# Patient Record
Sex: MALE | Race: WHITE | NOT HISPANIC OR LATINO | Employment: OTHER | URBAN - METROPOLITAN AREA
[De-identification: names, ages, dates, MRNs, and addresses within clinical notes are randomized per-mention and may not be internally consistent; named-entity substitution may affect disease eponyms.]

---

## 2022-01-27 ENCOUNTER — TRANSCRIBE ORDERS (OUTPATIENT)
Dept: ADMINISTRATIVE | Facility: HOSPITAL | Age: 62
End: 2022-01-27

## 2022-01-27 DIAGNOSIS — R91.8 PULMONARY NODULES: Primary | ICD-10-CM

## 2022-02-18 ENCOUNTER — HOSPITAL ENCOUNTER (OUTPATIENT)
Dept: CT IMAGING | Facility: HOSPITAL | Age: 62
Discharge: HOME | End: 2022-02-18
Admitting: INTERNAL MEDICINE

## 2022-02-18 DIAGNOSIS — R91.8 PULMONARY NODULES: ICD-10-CM

## 2022-02-18 LAB — CREAT BLDA-MCNC: 0.8 MG/DL (ref 0.6–1.3)

## 2022-02-18 PROCEDURE — 25010000002 IOPAMIDOL 61 % SOLUTION: Performed by: INTERNAL MEDICINE

## 2022-02-18 PROCEDURE — 71260 CT THORAX DX C+: CPT

## 2022-02-18 PROCEDURE — 82565 ASSAY OF CREATININE: CPT

## 2022-02-18 RX ADMIN — IOPAMIDOL 75 ML: 612 INJECTION, SOLUTION INTRAVENOUS at 14:33

## 2024-05-27 ENCOUNTER — HOSPITAL ENCOUNTER (OUTPATIENT)
Facility: HOSPITAL | Age: 64
Setting detail: OBSERVATION
Discharge: NURSING, REHAB OR LONG-TERM CARE | End: 2024-05-30
Attending: EMERGENCY MEDICINE | Admitting: INTERNAL MEDICINE
Payer: MEDICAID

## 2024-05-27 ENCOUNTER — APPOINTMENT (OUTPATIENT)
Dept: GENERAL RADIOLOGY | Facility: HOSPITAL | Age: 64
End: 2024-05-27
Payer: MEDICAID

## 2024-05-27 ENCOUNTER — APPOINTMENT (OUTPATIENT)
Dept: CT IMAGING | Facility: HOSPITAL | Age: 64
End: 2024-05-27
Payer: MEDICAID

## 2024-05-27 DIAGNOSIS — S09.90XA CLOSED HEAD INJURY, INITIAL ENCOUNTER: ICD-10-CM

## 2024-05-27 DIAGNOSIS — I95.9 HYPOTENSION, UNSPECIFIED HYPOTENSION TYPE: Primary | ICD-10-CM

## 2024-05-27 DIAGNOSIS — R94.31 ABNORMAL EKG: ICD-10-CM

## 2024-05-27 DIAGNOSIS — E87.20 LACTIC ACIDOSIS: ICD-10-CM

## 2024-05-27 LAB
ALBUMIN SERPL-MCNC: 3.7 G/DL (ref 3.5–5.2)
ALBUMIN/GLOB SERPL: 1.6 G/DL
ALP SERPL-CCNC: 119 U/L (ref 39–117)
ALT SERPL W P-5'-P-CCNC: 22 U/L (ref 1–41)
ANION GAP SERPL CALCULATED.3IONS-SCNC: 9 MMOL/L (ref 5–15)
AST SERPL-CCNC: 15 U/L (ref 1–40)
BASOPHILS # BLD AUTO: 0.04 10*3/MM3 (ref 0–0.2)
BASOPHILS NFR BLD AUTO: 0.4 % (ref 0–1.5)
BILIRUB SERPL-MCNC: 0.3 MG/DL (ref 0–1.2)
BILIRUB UR QL STRIP: NEGATIVE
BUN SERPL-MCNC: 17 MG/DL (ref 8–23)
BUN/CREAT SERPL: 13 (ref 7–25)
CALCIUM SPEC-SCNC: 9 MG/DL (ref 8.6–10.5)
CHLORIDE SERPL-SCNC: 111 MMOL/L (ref 98–107)
CLARITY UR: CLEAR
CO2 SERPL-SCNC: 23 MMOL/L (ref 22–29)
COLOR UR: YELLOW
CREAT SERPL-MCNC: 1.31 MG/DL (ref 0.76–1.27)
D-LACTATE SERPL-SCNC: 1.5 MMOL/L (ref 0.5–2)
D-LACTATE SERPL-SCNC: 2.8 MMOL/L (ref 0.5–2)
DEPRECATED RDW RBC AUTO: 43.9 FL (ref 37–54)
EGFRCR SERPLBLD CKD-EPI 2021: 60.4 ML/MIN/1.73
EOSINOPHIL # BLD AUTO: 0.03 10*3/MM3 (ref 0–0.4)
EOSINOPHIL NFR BLD AUTO: 0.3 % (ref 0.3–6.2)
ERYTHROCYTE [DISTWIDTH] IN BLOOD BY AUTOMATED COUNT: 12.8 % (ref 12.3–15.4)
GEN 5 2HR TROPONIN T REFLEX: 10 NG/L
GLOBULIN UR ELPH-MCNC: 2.3 GM/DL
GLUCOSE SERPL-MCNC: 179 MG/DL (ref 65–99)
GLUCOSE UR STRIP-MCNC: NEGATIVE MG/DL
HCT VFR BLD AUTO: 46.9 % (ref 37.5–51)
HGB BLD-MCNC: 14.6 G/DL (ref 13–17.7)
HGB UR QL STRIP.AUTO: NEGATIVE
IMM GRANULOCYTES # BLD AUTO: 0.02 10*3/MM3 (ref 0–0.05)
IMM GRANULOCYTES NFR BLD AUTO: 0.2 % (ref 0–0.5)
KETONES UR QL STRIP: NEGATIVE
LEUKOCYTE ESTERASE UR QL STRIP.AUTO: NEGATIVE
LYMPHOCYTES # BLD AUTO: 1.18 10*3/MM3 (ref 0.7–3.1)
LYMPHOCYTES NFR BLD AUTO: 11.8 % (ref 19.6–45.3)
MCH RBC QN AUTO: 29 PG (ref 26.6–33)
MCHC RBC AUTO-ENTMCNC: 31.1 G/DL (ref 31.5–35.7)
MCV RBC AUTO: 93.1 FL (ref 79–97)
MONOCYTES # BLD AUTO: 0.29 10*3/MM3 (ref 0.1–0.9)
MONOCYTES NFR BLD AUTO: 2.9 % (ref 5–12)
NEUTROPHILS NFR BLD AUTO: 8.41 10*3/MM3 (ref 1.7–7)
NEUTROPHILS NFR BLD AUTO: 84.4 % (ref 42.7–76)
NITRITE UR QL STRIP: NEGATIVE
NRBC BLD AUTO-RTO: 0 /100 WBC (ref 0–0.2)
PH UR STRIP.AUTO: 6 [PH] (ref 5–8)
PLATELET # BLD AUTO: 273 10*3/MM3 (ref 140–450)
PMV BLD AUTO: 9.9 FL (ref 6–12)
POTASSIUM SERPL-SCNC: 4.4 MMOL/L (ref 3.5–5.2)
PROT SERPL-MCNC: 6 G/DL (ref 6–8.5)
PROT UR QL STRIP: ABNORMAL
RBC # BLD AUTO: 5.04 10*6/MM3 (ref 4.14–5.8)
SODIUM SERPL-SCNC: 143 MMOL/L (ref 136–145)
SP GR UR STRIP: 1.01 (ref 1–1.03)
TROPONIN T DELTA: 0 NG/L
TROPONIN T SERPL HS-MCNC: 10 NG/L
UROBILINOGEN UR QL STRIP: ABNORMAL
WBC NRBC COR # BLD AUTO: 9.97 10*3/MM3 (ref 3.4–10.8)

## 2024-05-27 PROCEDURE — G0378 HOSPITAL OBSERVATION PER HR: HCPCS

## 2024-05-27 PROCEDURE — 84484 ASSAY OF TROPONIN QUANT: CPT | Performed by: PHYSICIAN ASSISTANT

## 2024-05-27 PROCEDURE — 93010 ELECTROCARDIOGRAM REPORT: CPT | Performed by: INTERNAL MEDICINE

## 2024-05-27 PROCEDURE — 96361 HYDRATE IV INFUSION ADD-ON: CPT

## 2024-05-27 PROCEDURE — 80053 COMPREHEN METABOLIC PANEL: CPT | Performed by: PHYSICIAN ASSISTANT

## 2024-05-27 PROCEDURE — 81003 URINALYSIS AUTO W/O SCOPE: CPT | Performed by: PHYSICIAN ASSISTANT

## 2024-05-27 PROCEDURE — 96372 THER/PROPH/DIAG INJ SC/IM: CPT

## 2024-05-27 PROCEDURE — 25010000002 ENOXAPARIN PER 10 MG: Performed by: INTERNAL MEDICINE

## 2024-05-27 PROCEDURE — 93005 ELECTROCARDIOGRAM TRACING: CPT | Performed by: EMERGENCY MEDICINE

## 2024-05-27 PROCEDURE — 83605 ASSAY OF LACTIC ACID: CPT | Performed by: PHYSICIAN ASSISTANT

## 2024-05-27 PROCEDURE — 85025 COMPLETE CBC W/AUTO DIFF WBC: CPT | Performed by: PHYSICIAN ASSISTANT

## 2024-05-27 PROCEDURE — 99285 EMERGENCY DEPT VISIT HI MDM: CPT

## 2024-05-27 PROCEDURE — 72125 CT NECK SPINE W/O DYE: CPT

## 2024-05-27 PROCEDURE — 25810000003 SODIUM CHLORIDE 0.9 % SOLUTION: Performed by: INTERNAL MEDICINE

## 2024-05-27 PROCEDURE — 36415 COLL VENOUS BLD VENIPUNCTURE: CPT | Performed by: PHYSICIAN ASSISTANT

## 2024-05-27 PROCEDURE — 25810000003 SODIUM CHLORIDE 0.9 % SOLUTION: Performed by: PHYSICIAN ASSISTANT

## 2024-05-27 PROCEDURE — 70450 CT HEAD/BRAIN W/O DYE: CPT

## 2024-05-27 PROCEDURE — 71045 X-RAY EXAM CHEST 1 VIEW: CPT

## 2024-05-27 RX ORDER — ACETAMINOPHEN 325 MG/1
650 TABLET ORAL EVERY 4 HOURS PRN
COMMUNITY

## 2024-05-27 RX ORDER — HYDRALAZINE HYDROCHLORIDE 50 MG/1
100 TABLET, FILM COATED ORAL 3 TIMES DAILY
Status: DISCONTINUED | OUTPATIENT
Start: 2024-05-27 | End: 2024-05-30 | Stop reason: HOSPADM

## 2024-05-27 RX ORDER — CARVEDILOL 25 MG/1
1 TABLET ORAL 2 TIMES DAILY
COMMUNITY

## 2024-05-27 RX ORDER — HYDRALAZINE HYDROCHLORIDE 100 MG/1
1 TABLET, FILM COATED ORAL 3 TIMES DAILY
COMMUNITY

## 2024-05-27 RX ORDER — CLONIDINE HYDROCHLORIDE 0.3 MG/1
0.3 TABLET ORAL 2 TIMES DAILY PRN
COMMUNITY
End: 2024-05-30 | Stop reason: HOSPADM

## 2024-05-27 RX ORDER — CLOTRIMAZOLE 1 %
1 CREAM (GRAM) TOPICAL 2 TIMES DAILY
COMMUNITY
Start: 2024-04-11 | End: 2024-05-30 | Stop reason: HOSPADM

## 2024-05-27 RX ORDER — SODIUM CHLORIDE 9 MG/ML
125 INJECTION, SOLUTION INTRAVENOUS CONTINUOUS
Status: DISCONTINUED | OUTPATIENT
Start: 2024-05-27 | End: 2024-05-29

## 2024-05-27 RX ORDER — FOLIC ACID 1 MG/1
1000 TABLET ORAL DAILY
Status: DISCONTINUED | OUTPATIENT
Start: 2024-05-27 | End: 2024-05-30 | Stop reason: HOSPADM

## 2024-05-27 RX ORDER — BUSPIRONE HYDROCHLORIDE 5 MG/1
5 TABLET ORAL 3 TIMES DAILY
Status: DISCONTINUED | OUTPATIENT
Start: 2024-05-27 | End: 2024-05-30 | Stop reason: HOSPADM

## 2024-05-27 RX ORDER — ENOXAPARIN SODIUM 100 MG/ML
40 INJECTION SUBCUTANEOUS EVERY 24 HOURS
Status: DISCONTINUED | OUTPATIENT
Start: 2024-05-27 | End: 2024-05-28

## 2024-05-27 RX ORDER — AMLODIPINE BESYLATE 10 MG/1
1 TABLET ORAL DAILY
COMMUNITY

## 2024-05-27 RX ORDER — BUSPIRONE HYDROCHLORIDE 5 MG/1
5 TABLET ORAL 2 TIMES DAILY
COMMUNITY

## 2024-05-27 RX ORDER — CARVEDILOL 25 MG/1
25 TABLET ORAL 2 TIMES DAILY
Status: DISCONTINUED | OUTPATIENT
Start: 2024-05-27 | End: 2024-05-30 | Stop reason: HOSPADM

## 2024-05-27 RX ORDER — BISACODYL 10 MG
10 SUPPOSITORY, RECTAL RECTAL DAILY PRN
COMMUNITY
End: 2024-05-30 | Stop reason: HOSPADM

## 2024-05-27 RX ORDER — FOLIC ACID 1 MG/1
1 TABLET ORAL 2 TIMES DAILY
COMMUNITY

## 2024-05-27 RX ORDER — ACETAMINOPHEN 325 MG/1
650 TABLET ORAL EVERY 4 HOURS PRN
Status: DISCONTINUED | OUTPATIENT
Start: 2024-05-27 | End: 2024-05-30 | Stop reason: HOSPADM

## 2024-05-27 RX ADMIN — BUSPIRONE HYDROCHLORIDE 5 MG: 5 TABLET ORAL at 18:09

## 2024-05-27 RX ADMIN — FOLIC ACID 1000 MCG: 1 TABLET ORAL at 18:09

## 2024-05-27 RX ADMIN — SODIUM CHLORIDE 125 ML/HR: 9 INJECTION, SOLUTION INTRAVENOUS at 16:09

## 2024-05-27 RX ADMIN — ENOXAPARIN SODIUM 40 MG: 100 INJECTION SUBCUTANEOUS at 18:08

## 2024-05-27 RX ADMIN — BUSPIRONE HYDROCHLORIDE 5 MG: 5 TABLET ORAL at 22:42

## 2024-05-27 RX ADMIN — HYDRALAZINE HYDROCHLORIDE 100 MG: 50 TABLET ORAL at 22:42

## 2024-05-27 RX ADMIN — SODIUM CHLORIDE 1000 ML: 9 INJECTION, SOLUTION INTRAVENOUS at 12:48

## 2024-05-27 RX ADMIN — HYDRALAZINE HYDROCHLORIDE 100 MG: 50 TABLET ORAL at 18:09

## 2024-05-27 RX ADMIN — SODIUM CHLORIDE 125 ML/HR: 9 INJECTION, SOLUTION INTRAVENOUS at 23:54

## 2024-05-27 RX ADMIN — CARVEDILOL 25 MG: 25 TABLET, FILM COATED ORAL at 21:44

## 2024-05-27 NOTE — PLAN OF CARE
Goal Outcome Evaluation:  Plan of Care Reviewed With: patient        Progress: no change  Outcome Evaluation: A&Ox4. RA. BP improving. No skin issues noted. Reg cardiac diet. IVF. No other issues noted. VSS.

## 2024-05-27 NOTE — ED NOTES
Patient to ER via ems from [facility]  Patient was given BP med got up later to go to the bathroom and feel  EMS reports patient is hypotensive   Patient hit face pain hit is nose    Facility reports patient is more pale than normal       Patient has hx of high BP

## 2024-05-27 NOTE — ED PROVIDER NOTES
EMERGENCY DEPARTMENT ENCOUNTER    Room Number:  40/40  Date of encounter:  5/27/2024  PCP: José Aviles MD  Historian: Patient  Chronic or social conditions impacting care (social determinants of health): Nursing home resident    HPI:  Chief Complaint: Weakness, fall  A complete HPI/ROS/PMH/PSH/SH/FH are unobtainable due to: Nothing    Context: Amos Nevarez is a 63 y.o. male with a history of hypertension.  He presents to the ED c/o acute injuries sustained in a fall prior to arrival.  Patient reportedly was walking to the bathroom when he suddenly became lightheaded and fell.  He did not lose consciousness.  He fell and hitting his head on the ground.  He takes no blood thinners.  Patient was found to be hypotensive by nursing home staff and EMS.  He states he had been in his normal state of health earlier this morning.  He denies any recent illness.  Denies any recent chest pain, shortness of breath.    Review of prior external notes (non-ED):   Reviewed primary care office visit from 11/3/2021.  Patient being followed for malnutrition, hypertension.    Review of prior external test results outside of this encounter:  None    PAST MEDICAL HISTORY  Active Ambulatory Problems     Diagnosis Date Noted    No Active Ambulatory Problems     Resolved Ambulatory Problems     Diagnosis Date Noted    No Resolved Ambulatory Problems     No Additional Past Medical History         PAST SURGICAL HISTORY  No past surgical history on file.      FAMILY HISTORY  No family history on file.      SOCIAL HISTORY  Social History     Socioeconomic History    Marital status:          ALLERGIES  Patient has no known allergies.        REVIEW OF SYSTEMS  All systems reviewed and negative except for those discussed in HPI.       PHYSICAL EXAM    I have reviewed the triage vital signs and nursing notes.    ED Triage Vitals [05/27/24 1218]   Temp Heart Rate Resp BP SpO2   97.8 °F (36.6 °C) 56 16 (!) 81/49 96 %      Temp src  Heart Rate Source Patient Position BP Location FiO2 (%)   -- -- -- -- --       Physical Exam  GENERAL: Alert, oriented, chronically ill-appearing, not distressed  HENT: Mild tenderness over the nose without bleeding or septal hematoma.  No dental injury.  No cervical tenderness.  EYES: no scleral icterus, EOMI  CV: regular rhythm, regular rate, no murmur  RESPIRATORY: normal effort, CTA  ABDOMEN: soft, nontender  MUSCULOSKELETAL: no deformity, FROM, no calf swelling or tenderness  NEURO: alert, moves all extremities, follows commands  SKIN: warm, dry        LAB RESULTS  Recent Results (from the past 24 hour(s))   ECG 12 Lead Syncope    Collection Time: 05/27/24 12:28 PM   Result Value Ref Range    QT Interval 472 ms    QTC Interval 456 ms   Comprehensive Metabolic Panel    Collection Time: 05/27/24 12:53 PM    Specimen: Blood   Result Value Ref Range    Glucose 179 (H) 65 - 99 mg/dL    BUN 17 8 - 23 mg/dL    Creatinine 1.31 (H) 0.76 - 1.27 mg/dL    Sodium 143 136 - 145 mmol/L    Potassium 4.4 3.5 - 5.2 mmol/L    Chloride 111 (H) 98 - 107 mmol/L    CO2 23.0 22.0 - 29.0 mmol/L    Calcium 9.0 8.6 - 10.5 mg/dL    Total Protein 6.0 6.0 - 8.5 g/dL    Albumin 3.7 3.5 - 5.2 g/dL    ALT (SGPT) 22 1 - 41 U/L    AST (SGOT) 15 1 - 40 U/L    Alkaline Phosphatase 119 (H) 39 - 117 U/L    Total Bilirubin 0.3 0.0 - 1.2 mg/dL    Globulin 2.3 gm/dL    A/G Ratio 1.6 g/dL    BUN/Creatinine Ratio 13.0 7.0 - 25.0    Anion Gap 9.0 5.0 - 15.0 mmol/L    eGFR 60.4 >60.0 mL/min/1.73   High Sensitivity Troponin T    Collection Time: 05/27/24 12:53 PM    Specimen: Blood   Result Value Ref Range    HS Troponin T 10 <22 ng/L   Lactic Acid, Plasma    Collection Time: 05/27/24 12:53 PM    Specimen: Blood   Result Value Ref Range    Lactate 2.8 (C) 0.5 - 2.0 mmol/L   CBC Auto Differential    Collection Time: 05/27/24 12:53 PM    Specimen: Blood   Result Value Ref Range    WBC 9.97 3.40 - 10.80 10*3/mm3    RBC 5.04 4.14 - 5.80 10*6/mm3    Hemoglobin  14.6 13.0 - 17.7 g/dL    Hematocrit 46.9 37.5 - 51.0 %    MCV 93.1 79.0 - 97.0 fL    MCH 29.0 26.6 - 33.0 pg    MCHC 31.1 (L) 31.5 - 35.7 g/dL    RDW 12.8 12.3 - 15.4 %    RDW-SD 43.9 37.0 - 54.0 fl    MPV 9.9 6.0 - 12.0 fL    Platelets 273 140 - 450 10*3/mm3    Neutrophil % 84.4 (H) 42.7 - 76.0 %    Lymphocyte % 11.8 (L) 19.6 - 45.3 %    Monocyte % 2.9 (L) 5.0 - 12.0 %    Eosinophil % 0.3 0.3 - 6.2 %    Basophil % 0.4 0.0 - 1.5 %    Immature Grans % 0.2 0.0 - 0.5 %    Neutrophils, Absolute 8.41 (H) 1.70 - 7.00 10*3/mm3    Lymphocytes, Absolute 1.18 0.70 - 3.10 10*3/mm3    Monocytes, Absolute 0.29 0.10 - 0.90 10*3/mm3    Eosinophils, Absolute 0.03 0.00 - 0.40 10*3/mm3    Basophils, Absolute 0.04 0.00 - 0.20 10*3/mm3    Immature Grans, Absolute 0.02 0.00 - 0.05 10*3/mm3    nRBC 0.0 0.0 - 0.2 /100 WBC   Urinalysis With Microscopic If Indicated (No Culture) - Urine, Clean Catch    Collection Time: 05/27/24  1:32 PM    Specimen: Urine, Clean Catch   Result Value Ref Range    Color, UA Yellow Yellow, Straw    Appearance, UA Clear Clear    pH, UA 6.0 5.0 - 8.0    Specific Gravity, UA 1.008 1.005 - 1.030    Glucose, UA Negative Negative    Ketones, UA Negative Negative    Bilirubin, UA Negative Negative    Blood, UA Negative Negative    Protein, UA Trace (A) Negative    Leuk Esterase, UA Negative Negative    Nitrite, UA Negative Negative    Urobilinogen, UA 1.0 E.U./dL 0.2 - 1.0 E.U./dL       Ordered the above labs and independently reviewed the results.        RADIOLOGY  XR Chest 1 View    Result Date: 5/27/2024  XR CHEST 1 VW-  INDICATION: Weakness  COMPARISON: CT chest 2/18/2022      Hyperinflated lungs corresponding with known emphysematous changes. No focal consolidation. No pleural effusion or pneumothorax. Normal size cardiomediastinal silhouette. No focal osseous abnormality.  This report was finalized on 5/27/2024 2:06 PM by Dr. Juan Hirsch M.D on Workstation: [id number]       I ordered the above noted  radiological studies. Reviewed by me and discussed with radiologist.  See dictation for official radiology interpretation.      MEDICATIONS GIVEN IN ER    Medications   sodium chloride 0.9 % infusion (has no administration in time range)   sodium chloride 0.9 % bolus 1,000 mL (0 mL Intravenous Stopped 5/27/24 1412)         ADDITIONAL ORDERS CONSIDERED BUT NOT ORDERED:  Considered antibiotics however no definitive source found.  I suspect his lactic acidosis may be from his hypotension.      PROGRESS, DATA ANALYSIS, CONSULTS, AND MEDICAL DECISION MAKING    All labs have been independently interpreted by myself.  All radiology studies have been independently interpreted by myself and discussed with radiologist dictating the report.   EKG's independently interpreted by myself.  Discussion below represents my analysis of pertinent findings related to patient's condition, differential diagnosis, treatment plan and final disposition.    I have discussed case with Dr. Florez, emergency room physician.  He has performed his own bedside examination and agrees with treatment plan.    ED Course as of 05/27/24 1458   Mon May 27, 2024   1236 Presents after episode of weakness, hypotension, fall at the nursing home.  Patient is alert and oriented at this time.  Patient is hypotensive.  Patient had head trauma.  No blood thinners.  Plan for cardiac evaluation, CT imaging of the head and cervical spine. [EE]   1238 EKG independently interpreted myself.  Time 1228.  Sinus bradycardia, rate 56.  Normal P/ALLEN.  QRS normal with normal axis.  Inverted T waves laterally.  No previous for comparison. [EE]   1244 BP(!): 81/49 [EE]   1303 Chest x-ray independently interpreted myself shows no acute infiltrate. [EE]   1308 WBC: 9.97 [EE]   1308 Hemoglobin: 14.6 [EE]   1328 HS Troponin T: 10 [EE]   1345 Lactate(!!): 2.8 [EE]   1431 I discussed case with Dr. Aviles, patient's primary care physician.  He agrees to admit. [EE]   1433 BP:  118/76  Patient's blood pressure improved.  He denies any complaints. [EE]      ED Course User Index  [EE] Yves Dasilva PA       AS OF 14:58 EDT VITALS:    BP - 118/76  HR - 75  TEMP - 97.8 °F (36.6 °C)  O2 SATS - 96%        DIAGNOSIS  Final diagnoses:   Hypotension, unspecified hypotension type   Lactic acidosis   Closed head injury, initial encounter   Abnormal EKG         DISPOSITION  Admitted      Dictated utilizing Dragon dictation          Yves Dasilva PA  05/27/24 5602

## 2024-05-27 NOTE — ED NOTES
Nursing report ED to floor  Amos Nevarez  63 y.o.  male    HPI :  HPI (Adult)  Stated Reason for Visit: fall hypotension    Chief Complaint  Chief Complaint   Patient presents with    Fall    Hypotension       Admitting doctor:   José Aviles MD    Admitting diagnosis:   The primary encounter diagnosis was Hypotension, unspecified hypotension type. Diagnoses of Lactic acidosis and Closed head injury, initial encounter were also pertinent to this visit.    Code status:   Current Code Status       Date Active Code Status Order ID Comments User Context       Not on file            Allergies:   Patient has no known allergies.    Isolation:   No active isolations    Intake and Output    Intake/Output Summary (Last 24 hours) at 5/27/2024 1452  Last data filed at 5/27/2024 1412  Gross per 24 hour   Intake 1000 ml   Output --   Net 1000 ml       Weight:   There were no vitals filed for this visit.    Most recent vitals:   Vitals:    05/27/24 1401 05/27/24 1415 05/27/24 1417 05/27/24 1418   BP: 121/69 114/59 104/73 118/76   Patient Position:  Lying Standing Standing   Pulse: 60 65 73 75   Resp:       Temp:       SpO2:           Active LDAs/IV Access:   Lines, Drains & Airways       Active LDAs       Name Placement date Placement time Site Days    Peripheral IV 05/27/24 1246 Anterior;Right Forearm 05/27/24  1246  Forearm  less than 1                    Labs (abnormal labs have a star):   Labs Reviewed   COMPREHENSIVE METABOLIC PANEL - Abnormal; Notable for the following components:       Result Value    Glucose 179 (*)     Creatinine 1.31 (*)     Chloride 111 (*)     Alkaline Phosphatase 119 (*)     All other components within normal limits    Narrative:     GFR Normal >60  Chronic Kidney Disease <60  Kidney Failure <15     LACTIC ACID, PLASMA - Abnormal; Notable for the following components:    Lactate 2.8 (*)     All other components within normal limits   URINALYSIS W/ MICROSCOPIC IF INDICATED (NO CULTURE) -  Abnormal; Notable for the following components:    Protein, UA Trace (*)     All other components within normal limits    Narrative:     Urine microscopic not indicated.   CBC WITH AUTO DIFFERENTIAL - Abnormal; Notable for the following components:    MCHC 31.1 (*)     Neutrophil % 84.4 (*)     Lymphocyte % 11.8 (*)     Monocyte % 2.9 (*)     Neutrophils, Absolute 8.41 (*)     All other components within normal limits   TROPONIN - Normal    Narrative:     High Sensitive Troponin T Reference Range:  <14.0 ng/L- Negative Female for AMI  <22.0 ng/L- Negative Male for AMI  >=14 - Abnormal Female indicating possible myocardial injury.  >=22 - Abnormal Male indicating possible myocardial injury.   Clinicians would have to utilize clinical acumen, EKG, Troponin, and serial changes to determine if it is an Acute Myocardial Infarction or myocardial injury due to an underlying chronic condition.        HIGH SENSITIVITIY TROPONIN T 2HR   LACTIC ACID, REFLEX   CBC AND DIFFERENTIAL    Narrative:     The following orders were created for panel order CBC & Differential.  Procedure                               Abnormality         Status                     ---------                               -----------         ------                     CBC Auto Differential[[id number]]        Abnormal            Final result                 Please view results for these tests on the individual orders.       EKG:   ECG 12 Lead Syncope   Preliminary Result   HEART RATE= 56  bpm   RR Interval= 1071  ms   IN Interval= 122  ms   P Horizontal Axis= 28  deg   P Front Axis= 67  deg   QRSD Interval= 103  ms   QT Interval= 472  ms   QTcB= 456  ms   QRS Axis= 30  deg   T Wave Axis= 240  deg   - ABNORMAL ECG -   Sinus rhythm   Abnormal T, consider ischemia, diffuse leads   Electronically Signed By:    Date and Time of Study: 2024-05-27 12:28:25          Meds given in ED:   Medications   sodium chloride 0.9 % bolus 1,000 mL (0 mL Intravenous Stopped  5/27/24 1412)       Imaging results:  XR Chest 1 View    Result Date: 5/27/2024  Hyperinflated lungs corresponding with known emphysematous changes. No focal consolidation. No pleural effusion or pneumothorax. Normal size cardiomediastinal silhouette. No focal osseous abnormality.  This report was finalized on 5/27/2024 2:06 PM by Dr. Juan Hirsch M.D on Workstation: [id number]       Ambulatory status:   - assist x 1    Social issues:   Social History     Socioeconomic History    Marital status:        Peripheral Neurovascular  Peripheral Neurovascular (Adult)  Peripheral Neurovascular WDL: WDL    Neuro Cognitive  Neuro Cognitive (Adult)  Cognitive/Neuro/Behavioral WDL: WDL, level of consciousness, orientation, speech  Level of Consciousness: Alert  Orientation: oriented x 4  Speech: clear, spontaneous, logical  Pupils  Pupil PERRLA: yes    Learning       Respiratory  Respiratory WDL  Respiratory WDL: WDL, rhythm/pattern  Rhythm/Pattern, Respiratory: no shortness of breath reported, depth regular, pattern regular, unlabored    Abdominal Pain       Pain Assessments  Pain (Adult)  (0-10) Pain Rating: Rest: 0    NIH Stroke Scale       Beatrice Gonzalez RN  05/27/24 14:52 EDT

## 2024-05-28 LAB
ANION GAP SERPL CALCULATED.3IONS-SCNC: 13 MMOL/L (ref 5–15)
BASOPHILS # BLD AUTO: 0.04 10*3/MM3 (ref 0–0.2)
BASOPHILS NFR BLD AUTO: 0.6 % (ref 0–1.5)
BUN SERPL-MCNC: 13 MG/DL (ref 8–23)
BUN/CREAT SERPL: 17.1 (ref 7–25)
CALCIUM SPEC-SCNC: 8.4 MG/DL (ref 8.6–10.5)
CHLORIDE SERPL-SCNC: 111 MMOL/L (ref 98–107)
CO2 SERPL-SCNC: 21 MMOL/L (ref 22–29)
CREAT SERPL-MCNC: 0.76 MG/DL (ref 0.76–1.27)
DEPRECATED RDW RBC AUTO: 42.8 FL (ref 37–54)
EGFRCR SERPLBLD CKD-EPI 2021: 99.7 ML/MIN/1.73
EOSINOPHIL # BLD AUTO: 0.06 10*3/MM3 (ref 0–0.4)
EOSINOPHIL NFR BLD AUTO: 0.8 % (ref 0.3–6.2)
ERYTHROCYTE [DISTWIDTH] IN BLOOD BY AUTOMATED COUNT: 12.9 % (ref 12.3–15.4)
GLUCOSE SERPL-MCNC: 88 MG/DL (ref 65–99)
HCT VFR BLD AUTO: 41.8 % (ref 37.5–51)
HGB BLD-MCNC: 13.7 G/DL (ref 13–17.7)
IMM GRANULOCYTES # BLD AUTO: 0.04 10*3/MM3 (ref 0–0.05)
IMM GRANULOCYTES NFR BLD AUTO: 0.6 % (ref 0–0.5)
LYMPHOCYTES # BLD AUTO: 1.43 10*3/MM3 (ref 0.7–3.1)
LYMPHOCYTES NFR BLD AUTO: 19.7 % (ref 19.6–45.3)
MCH RBC QN AUTO: 29.5 PG (ref 26.6–33)
MCHC RBC AUTO-ENTMCNC: 32.8 G/DL (ref 31.5–35.7)
MCV RBC AUTO: 90.1 FL (ref 79–97)
MONOCYTES # BLD AUTO: 0.46 10*3/MM3 (ref 0.1–0.9)
MONOCYTES NFR BLD AUTO: 6.3 % (ref 5–12)
NEUTROPHILS NFR BLD AUTO: 5.22 10*3/MM3 (ref 1.7–7)
NEUTROPHILS NFR BLD AUTO: 72 % (ref 42.7–76)
NRBC BLD AUTO-RTO: 0 /100 WBC (ref 0–0.2)
PLATELET # BLD AUTO: 268 10*3/MM3 (ref 140–450)
PMV BLD AUTO: 10.2 FL (ref 6–12)
POTASSIUM SERPL-SCNC: 3.4 MMOL/L (ref 3.5–5.2)
QT INTERVAL: 472 MS
QTC INTERVAL: 456 MS
RBC # BLD AUTO: 4.64 10*6/MM3 (ref 4.14–5.8)
SODIUM SERPL-SCNC: 145 MMOL/L (ref 136–145)
WBC NRBC COR # BLD AUTO: 7.25 10*3/MM3 (ref 3.4–10.8)

## 2024-05-28 PROCEDURE — 87102 FUNGUS ISOLATION CULTURE: CPT | Performed by: INTERNAL MEDICINE

## 2024-05-28 PROCEDURE — G0378 HOSPITAL OBSERVATION PER HR: HCPCS

## 2024-05-28 PROCEDURE — 96361 HYDRATE IV INFUSION ADD-ON: CPT

## 2024-05-28 PROCEDURE — 85025 COMPLETE CBC W/AUTO DIFF WBC: CPT | Performed by: INTERNAL MEDICINE

## 2024-05-28 PROCEDURE — 25010000002 ENOXAPARIN PER 10 MG: Performed by: INTERNAL MEDICINE

## 2024-05-28 PROCEDURE — 25810000003 SODIUM CHLORIDE 0.9 % SOLUTION: Performed by: INTERNAL MEDICINE

## 2024-05-28 PROCEDURE — 96372 THER/PROPH/DIAG INJ SC/IM: CPT

## 2024-05-28 PROCEDURE — 80048 BASIC METABOLIC PNL TOTAL CA: CPT | Performed by: INTERNAL MEDICINE

## 2024-05-28 RX ORDER — FAMOTIDINE 20 MG/1
20 TABLET, FILM COATED ORAL
Status: DISCONTINUED | OUTPATIENT
Start: 2024-05-29 | End: 2024-05-30 | Stop reason: HOSPADM

## 2024-05-28 RX ORDER — ENOXAPARIN SODIUM 100 MG/ML
30 INJECTION SUBCUTANEOUS EVERY 24 HOURS
Status: DISCONTINUED | OUTPATIENT
Start: 2024-05-28 | End: 2024-05-30 | Stop reason: HOSPADM

## 2024-05-28 RX ORDER — POTASSIUM CHLORIDE 750 MG/1
40 TABLET, FILM COATED, EXTENDED RELEASE ORAL ONCE
Status: COMPLETED | OUTPATIENT
Start: 2024-05-28 | End: 2024-05-28

## 2024-05-28 RX ADMIN — SODIUM CHLORIDE 125 ML/HR: 9 INJECTION, SOLUTION INTRAVENOUS at 09:41

## 2024-05-28 RX ADMIN — CARVEDILOL 25 MG: 25 TABLET, FILM COATED ORAL at 20:00

## 2024-05-28 RX ADMIN — HYDRALAZINE HYDROCHLORIDE 100 MG: 50 TABLET ORAL at 09:40

## 2024-05-28 RX ADMIN — FOLIC ACID 1000 MCG: 1 TABLET ORAL at 09:40

## 2024-05-28 RX ADMIN — SODIUM CHLORIDE 125 ML/HR: 9 INJECTION, SOLUTION INTRAVENOUS at 23:53

## 2024-05-28 RX ADMIN — BUSPIRONE HYDROCHLORIDE 5 MG: 5 TABLET ORAL at 16:39

## 2024-05-28 RX ADMIN — BUSPIRONE HYDROCHLORIDE 5 MG: 5 TABLET ORAL at 20:00

## 2024-05-28 RX ADMIN — POTASSIUM CHLORIDE 40 MEQ: 750 TABLET, EXTENDED RELEASE ORAL at 18:41

## 2024-05-28 RX ADMIN — HYDRALAZINE HYDROCHLORIDE 100 MG: 50 TABLET ORAL at 20:00

## 2024-05-28 RX ADMIN — BUSPIRONE HYDROCHLORIDE 5 MG: 5 TABLET ORAL at 09:40

## 2024-05-28 RX ADMIN — CARVEDILOL 25 MG: 25 TABLET, FILM COATED ORAL at 09:40

## 2024-05-28 RX ADMIN — HYDRALAZINE HYDROCHLORIDE 100 MG: 50 TABLET ORAL at 16:39

## 2024-05-28 RX ADMIN — ENOXAPARIN SODIUM 30 MG: 100 INJECTION SUBCUTANEOUS at 16:39

## 2024-05-28 NOTE — H&P
HISTORY AND PHYSICAL   [facility], [facility]      2024    Patient Identification:    Name: Amos Nevarez  Age: 63 y.o.  Sex: male  :  1960  MRN: [id number]                       Primary Care Physician: José Aviles MD    Chief Complaint:      Chief Complaint   Patient presents with    Fall    Hypotension         History of Present Illness:     Patient is a 63-year-old gentleman, resident of nursing facility.  Patient has history of emphysema, hypertension, anxiety, hyperlipidemia.  Admission, patient was noted to be to go to the bathroom and got dizzy, lost his balance and fell forward onto the toilet.  His blood pressure was 90/67 and after a few minutes it went down to 60/34.  Patient was sent to the ER for evaluation.  In the ER, patient was found to have: Blood pressure 81/49, creatinine 1.31, sodium 143, lactic acid 2.8, WBC 9.87, hemoglobin 14.6.  Urinalysis was negative for infection, chest x-ray show hyperinflated lungs/emphysematous changes.  Nothing acute.  In the ER, patient was given bolus normal saline 1 L, patient was placed in the hospital for further management.      Past Medical History:  History reviewed. No pertinent past medical history.  Past Surgical History:  History reviewed. No pertinent surgical history.   Home Meds:  Medications Prior to Admission   Medication Sig Dispense Refill Last Dose    acetaminophen (TYLENOL) 325 MG tablet Take 2 tablets by mouth Every 4 (Four) Hours As Needed.   Patient Taking Differently    amLODIPine (NORVASC) 10 MG tablet Take 1 tablet by mouth Daily.   2024    busPIRone (BUSPAR) 5 MG tablet Take 1 tablet by mouth 2 (Two) Times a Day.   2024    carvedilol (COREG) 25 MG tablet Take 1 tablet by mouth 2 (Two) Times a Day.   2024    clotrimazole (LOTRIMIN) 1 % cream Apply 1 Application topically to the appropriate area as directed 2 (Two) Times a Day.   2024    folic acid (FOLVITE) 1 MG tablet Take 1 tablet by  "mouth 2 (Two) Times a Day.   Patient Taking Differently    hydrALAZINE (APRESOLINE) 100 MG tablet Take 1 tablet by mouth 3 (Three) Times a Day.   2024    bisacodyl (Dulcolax) 10 MG suppository Insert 1 suppository into the rectum Daily As Needed for Constipation.       cloNIDine (CATAPRES) 0.3 MG tablet Take 1 tablet by mouth 2 (Two) Times a Day As Needed for High Blood Pressure.          Allergies:  No Known Allergies  Immunizations:  Immunization History   Administered Date(s) Administered    COVID-19 (PFIZER) Purple Cap Monovalent 2020, 2021, 10/19/2021    Covid-19 (Pfizer) Gray Cap Monovalent 2022     Social History:   Social History     Social History Narrative    Not on file     Social History     Tobacco Use    Smoking status: Former     Current packs/day: 0.00     Average packs/day: 1 pack/day for 42.0 years (42.0 ttl pk-yrs)     Types: Cigarettes     Start date:      Quit date:      Years since quittin.4    Smokeless tobacco: Never   Substance Use Topics    Alcohol use: Never     Family History:  History reviewed. No pertinent family history.     Review of Systems  See history of present illness and past medical history.    No chest pain, shortness of breath.  No nausea, vomiting diarrhea, constipation  No history of hypertension, orthostatic hypotension  Does have history of difficult to treat hypertension, on multiple medication.      Objective:    Exam:    T MAX 24 hrs: Temp (24hrs), Av.8 °F (36.6 °C), Min:97.5 °F (36.4 °C), Max:98.1 °F (36.7 °C)    Vitals Ranges:   Temp:  [97.5 °F (36.4 °C)-98.1 °F (36.7 °C)] 98.1 °F (36.7 °C)  Heart Rate:  [56-79] 68  Resp:  [16] 16  BP: ()/(49-86) 138/69    /69 (BP Location: Left arm, Patient Position: Lying)   Pulse 68   Temp 98.1 °F (36.7 °C) (Oral)   Resp 16   Ht 167.6 cm (66\")   Wt 49 kg (108 lb)   SpO2 97%   BMI 17.43 kg/m²     General: Alert, oriented x 2-3. Cooperative, no distress, appears stated " age  HEENT:    Head: Normocephalic, without obvious abnormality, atraumatic  Eyes: EOM are normal. Pupils are equal  Oropharynx: Mucosa and tongue dry  Neck: Supple, symmetrical, trachea midline, no adenopathy;              thyroid:  no enlargement/tenderness/nodules;              no carotid bruit or JVD  Cardiovascular: Normal rate, regular rhythm and intact distal pulses.              Exam reveals no gallop and no friction rub. No murmur heard  Chest wall: No tenderness or deformity  Pulmonary: Clear to auscultation bilaterally, respirations unlabored.               No rhonchi, wheezing or rales.   Abdominal: Soft, nontender, bowel sounds active all four quadrants,     no masses, no hepatomegaly, no splenomegaly.   Extremities: Normal, atraumatic, no cyanosis or edema  Pulses: 2 + symmetric all extremities  Neurological: Patient is alert and oriented to person, place. No new focal sensorimotor deficit.   Skin: Skin color, texture, normal. Turgor is decreased. No rashes or lesions      Data Review:    Results from last 7 days   Lab Units 05/28/24  0552 05/27/24  1253   WBC 10*3/mm3 7.25 9.97   HEMOGLOBIN g/dL 13.7 14.6   HEMATOCRIT % 41.8 46.9   PLATELETS 10*3/mm3 268 273       Results from last 7 days   Lab Units 05/28/24  0552 05/27/24  1253   SODIUM mmol/L 145 143   POTASSIUM mmol/L 3.4* 4.4   CHLORIDE mmol/L 111* 111*   CO2 mmol/L 21.0* 23.0   BUN mg/dL 13 17   CREATININE mg/dL 0.76 1.31*   CALCIUM mg/dL 8.4* 9.0   BILIRUBIN mg/dL  --  0.3   ALK PHOS U/L  --  119*   ALT (SGPT) U/L  --  22   AST (SGOT) U/L  --  15   GLUCOSE mg/dL 88 179*                 Lab Results   Lab Value Date/Time    TROPONINT 10 05/27/2024 1507    TROPONINT 10 05/27/2024 1253       Brief Urine Lab Results  (Last result in the past 365 days)        Color   Clarity   Blood   Leuk Est   Nitrite   Protein   CREAT   Urine HCG        05/27/24 1332 Yellow   Clear   Negative   Negative   Negative   Trace                    Imaging Results (All)        Procedure Component Value Units Date/Time    CT Head Without Contrast [[id number]] Collected: 05/27/24 1628     Updated: 05/27/24 1650    Narrative:      CT HEAD AND CERVICAL SPINE WITHOUT CONTRAST     CLINICAL HISTORY: [] Post fall     TECHNIQUE: CT scan of the head and cervical spine was obtained with  axial soft tissue and bone algorithm images. No intravenous contrast was  administered. Sagittal and coronal reconstructions were obtained.     COMPARISON: [CT chest 2/18/2022]     FINDINGS:  No intracranial hemorrhage.  No midline shift or mass effect.   No CT evidence of acute territorial infarction.  No extraaxial  collection.  No hydrocephalus. Encephalomalacia involving a portion of  the bilateral frontal lobe straight gyri (series 2/image 11). Mostly  opacified left mastoid air cells. Opacified right maxillary sinus  completely seen on CT cervical spine with associated volume loss  suggesting chronicity.     Diffuse heterogeneous low bone mineralization throughout the cervical  spine, partially limits evaluation for fracture. Within that limitation,  cervical lordosis without subluxation. Vertebral body heights are  maintained without fracture. Advanced degeneration C6-C7 with ankylosis.  Mild disc degeneration throughout the remaining cervical spine.  Multilevel mild bilateral facet arthropathy. Likely mild spinal canal  stenosis at C6-C7 secondary to a posterior disc osteophyte complex. No  prevertebral soft tissue swelling. Partially visualized advanced  biapical centrilobular and paraseptal emphysema.          Impression:      1. No acute intracranial abnormality.  2. No cervical spine fracture or traumatic subluxation. Heterogeneous  low bone mineralization throughout the cervical spine partially limits  evaluation for fracture.  3. Encephalomalacia involving a portion of the bilateral frontal lobe  straight gyri is chronic in appearance and could be related to prior  remote trauma.  4. Partially  visualized advanced biapical centrilobular and paraseptal  emphysema.              Radiation dose reduction techniques were utilized, including automated  exposure control and exposure modulation based on body size.     This report was finalized on 5/27/2024 4:47 PM by Dr. Juan Hirsch M.D on Workstation: [id number]       CT Cervical Spine Without Contrast [[id number]] Collected: 05/27/24 1628     Updated: 05/27/24 1650    Narrative:      CT HEAD AND CERVICAL SPINE WITHOUT CONTRAST     CLINICAL HISTORY: [] Post fall     TECHNIQUE: CT scan of the head and cervical spine was obtained with  axial soft tissue and bone algorithm images. No intravenous contrast was  administered. Sagittal and coronal reconstructions were obtained.     COMPARISON: [CT chest 2/18/2022]     FINDINGS:  No intracranial hemorrhage.  No midline shift or mass effect.   No CT evidence of acute territorial infarction.  No extraaxial  collection.  No hydrocephalus. Encephalomalacia involving a portion of  the bilateral frontal lobe straight gyri (series 2/image 11). Mostly  opacified left mastoid air cells. Opacified right maxillary sinus  completely seen on CT cervical spine with associated volume loss  suggesting chronicity.     Diffuse heterogeneous low bone mineralization throughout the cervical  spine, partially limits evaluation for fracture. Within that limitation,  cervical lordosis without subluxation. Vertebral body heights are  maintained without fracture. Advanced degeneration C6-C7 with ankylosis.  Mild disc degeneration throughout the remaining cervical spine.  Multilevel mild bilateral facet arthropathy. Likely mild spinal canal  stenosis at C6-C7 secondary to a posterior disc osteophyte complex. No  prevertebral soft tissue swelling. Partially visualized advanced  biapical centrilobular and paraseptal emphysema.          Impression:      1. No acute intracranial abnormality.  2. No cervical spine fracture or traumatic subluxation.  Heterogeneous  low bone mineralization throughout the cervical spine partially limits  evaluation for fracture.  3. Encephalomalacia involving a portion of the bilateral frontal lobe  straight gyri is chronic in appearance and could be related to prior  remote trauma.  4. Partially visualized advanced biapical centrilobular and paraseptal  emphysema.              Radiation dose reduction techniques were utilized, including automated  exposure control and exposure modulation based on body size.     This report was finalized on 5/27/2024 4:47 PM by Dr. Juan Hirsch M.D on Workstation: [id number]       XR Chest 1 View [[id number]] Collected: 05/27/24 1404     Updated: 05/27/24 1409    Narrative:      XR CHEST 1 VW-     INDICATION: Weakness     COMPARISON: CT chest 2/18/2022       Impression:      Hyperinflated lungs corresponding with known emphysematous  changes. No focal consolidation. No pleural effusion or pneumothorax.  Normal size cardiomediastinal silhouette. No focal osseous abnormality.     This report was finalized on 5/27/2024 2:06 PM by Dr. Juan Hirsch M.D on Workstation: [id number]               Assessment:      Hypotension  Dehydration  Hypokalemia  Elevated lactate  COPD  Hypertension  Hyperlipidemia  Generalized anxiety disorder          Plan:    Observation status.  Patient apparently developed hypotension associated with dehydration, elevated lactate.  No signs of infection.  I wonder whether he took extra blood pressure medication.  She has been stable on current doses of medication for several years at the nursing facility.  Continue IV fluids.  Will check orthostatic tomorrow.  Monitor and correct electrolytes, replace potassium.  Encourage p.o. intake, patient has history of failure to thrive, however, since the last 6 months he has gained 4 pounds at the nursing facility.  Monitor mental status, appears to be at baseline.  Home medications  DVT prophylaxis: Enoxaparin  Stress ulcer  prophylaxis: Famotidine.  Will ask physical therapy to work with patient  Labs in am        José Aviles MD  5/28/2024  12:15 EDT       I wore protective equipment throughout this patient encounter including a face mask, gloves, and protective eyewear.  Hand hygiene was performed before donning protective equipment and after removal when leaving the room.

## 2024-05-28 NOTE — PLAN OF CARE
Goal Outcome Evaluation:  Plan of Care Reviewed With: patient        Progress: improving  Outcome Evaluation: A&Ox4. RA. No dizziness reported with ambulation. IVF. BP stable. VSS.  1840- Potassium replaced per order.

## 2024-05-28 NOTE — PLAN OF CARE
Goal Outcome Evaluation:  Plan of Care Reviewed With: patient        Progress: improving  Outcome Evaluation: pt is axox4. VSS. BP stable with BP meds given. iv fluids running. denies dizziness upon standing.

## 2024-05-28 NOTE — PROGRESS NOTES
Continued Stay Note  [facility]     Patient Name: Amos Nevarez  MRN: [id number]  Today's Date: 5/28/2024    Admit Date: 5/27/2024    Plan: Return to [facility]   Discharge Plan       Row Name 05/28/24 1550       Plan    Plan Return to [facility]    Patient/Family in Agreement with Plan yes    Plan Comments Patient is off the unit for testing.  Per Awais, he is from [facility] with a bedhold.  CCP will follow/screen in meredith Hayes RN                      Expected Discharge Date and Time       Expected Discharge Date Expected Discharge Time    May 29, 2024               Becky S. [place], RN

## 2024-05-28 NOTE — DISCHARGE PLACEMENT REQUEST
"Elvis Samayoa (63 y.o. Male)       Date of Birth   1960    Social Security Number       Address   [facility] [address] [facility] [address] [place] [address]    Home Phone   [phone]    MRN   [id number]       Quaker   None    Marital Status                               Admission Date   5/27/24    Admission Type   Emergency    Admitting Provider   José Aviles MD    Attending Provider   José Aviles MD    Department, Room/Bed   [address], S[address]/1       Discharge Date       Discharge Disposition       Discharge Destination                                 Attending Provider: José Aviles MD    Allergies: No Known Allergies    Isolation: None   Infection: None   Code Status: CPR    Ht: 167.6 cm (66\")   Wt: 49 kg (108 lb)    Admission Cmt: None   Principal Problem: Hypotension [I95.9]                   Active Insurance as of 5/27/2024       Primary Coverage       Payor Plan Insurance Group Employer/Plan Group    [place]        Payor Plan Address Payor Plan Phone Number Payor Plan Fax Number Effective Dates    [address] [phone]  2/8/2022 - None Entered    [facility] [id number]         Subscriber Name Subscriber Birth Date Member ID       ELVIS SAMAYOA 1960 [id number]                     Emergency Contacts        (Rel.) Home Phone Work Phone Mobile Phone    CLINTON VEGA (Friend) [phone] -- [phone]                "

## 2024-05-29 LAB
ANION GAP SERPL CALCULATED.3IONS-SCNC: 11 MMOL/L (ref 5–15)
BASOPHILS # BLD AUTO: 0.04 10*3/MM3 (ref 0–0.2)
BASOPHILS NFR BLD AUTO: 0.5 % (ref 0–1.5)
BUN SERPL-MCNC: 12 MG/DL (ref 8–23)
BUN/CREAT SERPL: 16.7 (ref 7–25)
CALCIUM SPEC-SCNC: 8.5 MG/DL (ref 8.6–10.5)
CHLORIDE SERPL-SCNC: 112 MMOL/L (ref 98–107)
CO2 SERPL-SCNC: 21 MMOL/L (ref 22–29)
CREAT SERPL-MCNC: 0.72 MG/DL (ref 0.76–1.27)
DEPRECATED RDW RBC AUTO: 41.3 FL (ref 37–54)
EGFRCR SERPLBLD CKD-EPI 2021: 101.4 ML/MIN/1.73
EOSINOPHIL # BLD AUTO: 0.09 10*3/MM3 (ref 0–0.4)
EOSINOPHIL NFR BLD AUTO: 1.1 % (ref 0.3–6.2)
ERYTHROCYTE [DISTWIDTH] IN BLOOD BY AUTOMATED COUNT: 12.8 % (ref 12.3–15.4)
GLUCOSE SERPL-MCNC: 96 MG/DL (ref 65–99)
HCT VFR BLD AUTO: 43.4 % (ref 37.5–51)
HGB BLD-MCNC: 14.3 G/DL (ref 13–17.7)
IMM GRANULOCYTES # BLD AUTO: 0.02 10*3/MM3 (ref 0–0.05)
IMM GRANULOCYTES NFR BLD AUTO: 0.2 % (ref 0–0.5)
LYMPHOCYTES # BLD AUTO: 1.95 10*3/MM3 (ref 0.7–3.1)
LYMPHOCYTES NFR BLD AUTO: 24.2 % (ref 19.6–45.3)
MCH RBC QN AUTO: 29.4 PG (ref 26.6–33)
MCHC RBC AUTO-ENTMCNC: 32.9 G/DL (ref 31.5–35.7)
MCV RBC AUTO: 89.1 FL (ref 79–97)
MONOCYTES # BLD AUTO: 0.47 10*3/MM3 (ref 0.1–0.9)
MONOCYTES NFR BLD AUTO: 5.8 % (ref 5–12)
NEUTROPHILS NFR BLD AUTO: 5.49 10*3/MM3 (ref 1.7–7)
NEUTROPHILS NFR BLD AUTO: 68.2 % (ref 42.7–76)
NRBC BLD AUTO-RTO: 0 /100 WBC (ref 0–0.2)
PLATELET # BLD AUTO: 283 10*3/MM3 (ref 140–450)
PMV BLD AUTO: 10.2 FL (ref 6–12)
POTASSIUM SERPL-SCNC: 3.8 MMOL/L (ref 3.5–5.2)
QT INTERVAL: 419 MS
QTC INTERVAL: 436 MS
RBC # BLD AUTO: 4.87 10*6/MM3 (ref 4.14–5.8)
SODIUM SERPL-SCNC: 144 MMOL/L (ref 136–145)
WBC NRBC COR # BLD AUTO: 8.06 10*3/MM3 (ref 3.4–10.8)

## 2024-05-29 PROCEDURE — 93010 ELECTROCARDIOGRAM REPORT: CPT | Performed by: INTERNAL MEDICINE

## 2024-05-29 PROCEDURE — 96372 THER/PROPH/DIAG INJ SC/IM: CPT

## 2024-05-29 PROCEDURE — G0378 HOSPITAL OBSERVATION PER HR: HCPCS

## 2024-05-29 PROCEDURE — 99203 OFFICE O/P NEW LOW 30 MIN: CPT | Performed by: INTERNAL MEDICINE

## 2024-05-29 PROCEDURE — 25810000003 SODIUM CHLORIDE 0.9 % SOLUTION: Performed by: INTERNAL MEDICINE

## 2024-05-29 PROCEDURE — 93005 ELECTROCARDIOGRAM TRACING: CPT | Performed by: INTERNAL MEDICINE

## 2024-05-29 PROCEDURE — 85025 COMPLETE CBC W/AUTO DIFF WBC: CPT | Performed by: INTERNAL MEDICINE

## 2024-05-29 PROCEDURE — 99253 IP/OBS CNSLTJ NEW/EST LOW 45: CPT | Performed by: INTERNAL MEDICINE

## 2024-05-29 PROCEDURE — 25010000002 ENOXAPARIN PER 10 MG: Performed by: INTERNAL MEDICINE

## 2024-05-29 PROCEDURE — 80048 BASIC METABOLIC PNL TOTAL CA: CPT | Performed by: INTERNAL MEDICINE

## 2024-05-29 PROCEDURE — 96361 HYDRATE IV INFUSION ADD-ON: CPT

## 2024-05-29 RX ORDER — AMLODIPINE BESYLATE 10 MG/1
10 TABLET ORAL
Status: DISCONTINUED | OUTPATIENT
Start: 2024-05-29 | End: 2024-05-30 | Stop reason: HOSPADM

## 2024-05-29 RX ADMIN — HYDRALAZINE HYDROCHLORIDE 100 MG: 50 TABLET ORAL at 16:23

## 2024-05-29 RX ADMIN — BUSPIRONE HYDROCHLORIDE 5 MG: 5 TABLET ORAL at 09:21

## 2024-05-29 RX ADMIN — FAMOTIDINE 20 MG: 20 TABLET, FILM COATED ORAL at 17:18

## 2024-05-29 RX ADMIN — FOLIC ACID 1000 MCG: 1 TABLET ORAL at 09:21

## 2024-05-29 RX ADMIN — AMLODIPINE BESYLATE 10 MG: 10 TABLET ORAL at 14:04

## 2024-05-29 RX ADMIN — HYDRALAZINE HYDROCHLORIDE 100 MG: 50 TABLET ORAL at 09:21

## 2024-05-29 RX ADMIN — SODIUM CHLORIDE 125 ML/HR: 9 INJECTION, SOLUTION INTRAVENOUS at 06:26

## 2024-05-29 RX ADMIN — HYDRALAZINE HYDROCHLORIDE 100 MG: 50 TABLET ORAL at 20:07

## 2024-05-29 RX ADMIN — CARVEDILOL 25 MG: 25 TABLET, FILM COATED ORAL at 09:21

## 2024-05-29 RX ADMIN — CARVEDILOL 25 MG: 25 TABLET, FILM COATED ORAL at 20:07

## 2024-05-29 RX ADMIN — BUSPIRONE HYDROCHLORIDE 5 MG: 5 TABLET ORAL at 20:07

## 2024-05-29 RX ADMIN — FAMOTIDINE 20 MG: 20 TABLET, FILM COATED ORAL at 06:26

## 2024-05-29 RX ADMIN — BUSPIRONE HYDROCHLORIDE 5 MG: 5 TABLET ORAL at 16:22

## 2024-05-29 RX ADMIN — ENOXAPARIN SODIUM 30 MG: 100 INJECTION SUBCUTANEOUS at 17:18

## 2024-05-29 NOTE — PROGRESS NOTES
Discharge Planning Assessment  [facility]     Patient Name: Amos Nevarez  MRN: [id number]  Today's Date: 5/29/2024    Admit Date: 5/27/2024    Plan: Return to [facility] long term care, Transportation arranged for tomorrow, Thursday 5-30 at 1430 with [facility] chair van   Discharge Needs Assessment    No documentation.                  Discharge Plan       Row Name 05/29/24 1444       Plan    Plan Return to [facility] long term care, Transportation arranged for tomorrow, Thursday 5-30 at 1430 with [facility] chair van                  Continued Care and Services - Admitted Since 5/27/2024       Destination Coordination complete.      Service Provider Request Status Selected Services Address Phone Fax Patient Preferred    [facility]  Selected [facility] [address] [place] [address] [phone] --                  Expected Discharge Date and Time       Expected Discharge Date Expected Discharge Time    May 29, 2024            Demographic Summary    No documentation.                  Functional Status    No documentation.                  Psychosocial    No documentation.                  Abuse/Neglect    No documentation.                  Legal    No documentation.                  Substance Abuse    No documentation.                  Patient Forms    No documentation.                     Evelyn Berumen, RN

## 2024-05-29 NOTE — PROGRESS NOTES
Discharge Planning Assessment  [facility]     Patient Name: Amos Nevarez  MRN: [id number]  Today's Date: 5/29/2024    Admit Date: 5/27/2024    Plan: Return to [facility] long term care, transportation arranged for tomorrow, Thursday 5/30 at 1430 with [facility] chair [place]   Discharge Needs Assessment    No documentation.                  Discharge Plan       Row Name 05/29/24 1534       Plan    Plan Return to [facility] long term care, transportation arranged for tomorrow, Thursday 5/30 at 1430 with [facility]    Plan Comments Patient stated he will return to [facility] long term care. He has been there for the last 3 years. Michelle SANFORD      Row Name 05/29/24 1444       Plan    Plan Return to [facility] long term care, Transportation arranged for tomorrow, Thursday 5-30 at 1430 with [facility]                  Continued Care and Services - Admitted Since 5/27/2024       Destination Coordination complete.      Service Provider Request Status Selected Services Address Phone Fax Patient Preferred    [facility]  Selected Skilled Nursing [address] [place] [address] [phone] --                  Expected Discharge Date and Time       Expected Discharge Date Expected Discharge Time    May 29, 2024            Demographic Summary    No documentation.                  Functional Status    No documentation.                  Psychosocial    No documentation.                  Abuse/Neglect    No documentation.                  Legal    No documentation.                  Substance Abuse    No documentation.                  Patient Forms    No documentation.                     Evelyn Berumen, RN

## 2024-05-29 NOTE — CONSULTS
[facility]  Cardiology Consult Note    Patient Identification:  Name: Amos Nevarez  Age: 63 y.o.  Sex: male  :  1960  MRN: [id number]             Requesting Physician: Dr Aviles    Reason for Consultation / Chief Complaint: Hypotension, abnml ECG    History of Present Illness:   This is a 63 year old male resident of nursing home who is new with our service with hypertension, hyperlipidemia, emphysema, anxiety. He presented to [facility] ER with fall. He was walking to bathroom when he became dizzy/lightheaded and fell forwarding hitting his head on toilet. Patient was found to be hypotensive by EMS. Work up in ER with cr 1.31, lactate 2.8, cbc with normal hgb, hct, plt. Chest xr negative for acute findings. ECG SB with T wave inversion. HS troponin negative x2. He was treated with IVF and admitted for further management.     No prior cardiac testing noted within [facility] or care everywehre    Comorbid cardiac risk factors: hypertension, hyperlipidemia    Past Medical History:  History reviewed. No pertinent past medical history.  Past Surgical History:  History reviewed. No pertinent surgical history.   Allergies:  No Known Allergies  Home Meds:  Medications Prior to Admission   Medication Sig Dispense Refill Last Dose    acetaminophen (TYLENOL) 325 MG tablet Take 2 tablets by mouth Every 4 (Four) Hours As Needed.   Patient Taking Differently    amLODIPine (NORVASC) 10 MG tablet Take 1 tablet by mouth Daily.   2024    busPIRone (BUSPAR) 5 MG tablet Take 1 tablet by mouth 2 (Two) Times a Day.   2024    carvedilol (COREG) 25 MG tablet Take 1 tablet by mouth 2 (Two) Times a Day.   2024    clotrimazole (LOTRIMIN) 1 % cream Apply 1 Application topically to the appropriate area as directed 2 (Two) Times a Day.   2024    folic acid (FOLVITE) 1 MG tablet Take 1 tablet by mouth 2 (Two) Times a Day.   Patient Taking Differently    hydrALAZINE (APRESOLINE) 100 MG  "tablet Take 1 tablet by mouth 3 (Three) Times a Day.   2024    bisacodyl (Dulcolax) 10 MG suppository Insert 1 suppository into the rectum Daily As Needed for Constipation.       cloNIDine (CATAPRES) 0.3 MG tablet Take 1 tablet by mouth 2 (Two) Times a Day As Needed for High Blood Pressure.        Current Meds:   [unfilled]  Social History:   Social History     Tobacco Use    Smoking status: Former     Current packs/day: 0.00     Average packs/day: 1 pack/day for 42.0 years (42.0 ttl pk-yrs)     Types: Cigarettes     Start date:      Quit date:      Years since quittin.4    Smokeless tobacco: Never   Substance Use Topics    Alcohol use: Never      Family History:  History reviewed. No pertinent family history.     Review of Systems  Constitutional: No wt loss, fever   Gastrointestinal: No nausea , abdominal pain  Behavioral/Psych: No insomnia or anxiety   Cardiovascular ----positive for dizziness. All other systems reviewed and are negative            /81   Pulse 62   Temp 98.1 °F (36.7 °C) (Oral)   Resp 20   Ht 167.6 cm (66\")   Wt 49 kg (108 lb)   SpO2 96%   BMI 17.43 kg/m²   General appearance: No acute changes   Neck: Trachea midline; NECK, supple, no thyromegaly or lymphadenopathy   Lungs: Normal size and shape, normal breath sounds, equal distribution of air, no rales and rhonchi   CV: S1-S2 regular, no murmurs, no rub, no gallop   Abdomen: Soft, nontender; no masses , no abnormal abdominal sounds   Extremities: No deformity , normal color , no peripheral edema   Skin: Normal temperature, turgor and texture; no rash, ulcers              Cardiographics  ECG:                 Imaging  Chest X-ray:   IMPRESSION:  Hyperinflated lungs corresponding with known emphysematous  changes. No focal consolidation. No pleural effusion or pneumothorax.  Normal size cardiomediastinal silhouette. No focal osseous abnormality.     This report was finalized on 2024 2:06 PM by Dr. Martinez " DES Hirsch on Workstation: [id number]     Lab Review   Results from last 7 days   Lab Units 05/27/24  1507 05/27/24  1253   HSTROP T ng/L 10 10         Results from last 7 days   Lab Units 05/29/24  0454   SODIUM mmol/L 144   POTASSIUM mmol/L 3.8   BUN mg/dL 12   CREATININE mg/dL 0.72*   CALCIUM mg/dL 8.5*     @[id number]@  Results from last 7 days   Lab Units 05/29/24  0454 05/28/24  0552 05/27/24  1253   WBC 10*3/mm3 8.06 7.25 9.97   HEMOGLOBIN g/dL 14.3 13.7 14.6   HEMATOCRIT % 43.4 41.8 46.9   PLATELETS 10*3/mm3 283 268 273             Assessment:  Hypotension  Dehydration  Elevated lactate  Hyperlipidemia  Hypertension    Recommendations / Plan:         63-year-old male new to our practice admitted to the hospital with a low blood pressure and fall, patient was lightheaded prior to that, appears patient was dehydrated has responded well to the fluids, at this stage patient is hypertensive antihypertensive medication has been started    Patient will have the regular cardiac workup done including the echo and the stress test, further workup will be as needed      Telma Burgos MD  5/29/2024, 13:17 EDT      EMR Dragon/Transcription:   Dictated utilizing Dragon dictation

## 2024-05-29 NOTE — SIGNIFICANT NOTE
05/29/24 0627   OTHER   Discipline physical therapist   Therapy Assessment/Plan (PT)   Criteria for Skilled Interventions Met (PT) no problems identified which require skilled intervention  (Pt admitted with hypotension.  Currently with ampac of 24 and nsg doc pt is up indep.  Noted plan to return back to LTC facility.  No indication for acute PT needs.)

## 2024-05-29 NOTE — PROGRESS NOTES
"DAILY PROGRESS NOTE  [facility], [facility]    2024    Patient Identification:  Name: Amos Nevarez  Age: 63 y.o.  Sex: male  :  1960  MRN: [id number]           Primary Care Physician: José Aviles MD    Subjective:    Interval History:    Patient was no chest pain, shortness of breath.  No episodes of hypotension, syncope, lightheadedness.  Eating well.  Vital signs stable, except blood pressure elevated.  Saturation in room air is 97%.  EKG showed persistent ST-T wave changes  Potassium 3.8, creatinine 0.72, hemoglobin 14.3.    ROS:     No report for nausea, vomiting diarrhea, constipation, chest pain, shortness of breath.    Objective:    Scheduled Meds:  amLODIPine, 10 mg, Oral, Q24H  busPIRone, 5 mg, Oral, TID  carvedilol, 25 mg, Oral, BID  enoxaparin, 30 mg, Subcutaneous, Q24H  famotidine, 20 mg, Oral, BID AC  folic acid, 1,000 mcg, Oral, Daily  hydrALAZINE, 100 mg, Oral, TID        Continuous Infusions:       PRN Meds:    acetaminophen    Intake/Output:    Intake/Output Summary (Last 24 hours) at 2024 1301  Last data filed at 2024 0940  Gross per 24 hour   Intake 720 ml   Output --   Net 720 ml         Exam:    T MAX 24 hrs: Temp (24hrs), Av.2 °F (36.8 °C), Min:98.1 °F (36.7 °C), Max:98.4 °F (36.9 °C)    Vitals Ranges:   Temp:  [98.1 °F (36.7 °C)-98.4 °F (36.9 °C)] 98.4 °F (36.9 °C)  Heart Rate:  [60-67] 63  Resp:  [16] 16  BP: (115-181)/(59-99) 175/94    /94 (BP Location: Left arm, Patient Position: Lying)   Pulse 63   Temp 98.4 °F (36.9 °C) (Oral)   Resp 16   Ht 167.6 cm (66\")   Wt 49 kg (108 lb)   SpO2 96%   BMI 17.43 kg/m²     General: Alert, oriented x 2-3. Cooperative, no distress, appears stated age  Neck: Supple, symmetrical, trachea midline, no adenopathy;              thyroid:  no enlargement/tenderness/nodules;              no carotid bruit or JVD  Cardiovascular: Normal rate, regular rhythm and intact distal " pulses.              Exam reveals no gallop and no friction rub. No murmur heard  Pulmonary: Clear to auscultation bilaterally, respirations unlabored.               No rhonchi, wheezing or rales.   Abdominal: Soft, nontender, bowel sounds active all four quadrants,     no masses, no hepatomegaly, no splenomegaly.   Extremities: Normal, atraumatic, no cyanosis or edema  Pulses: 2 + symmetric all extremities  Neurological: Patient is alert and oriented to person, place. No new focal sensorimotor deficit.   Skin: Skin color, texture, normal. Turgor is decreased. No rashes or lesions            Data Review:    Results from last 7 days   Lab Units 05/29/24  0454 05/28/24  0552 05/27/24  1253   WBC 10*3/mm3 8.06 7.25 9.97   HEMOGLOBIN g/dL 14.3 13.7 14.6   HEMATOCRIT % 43.4 41.8 46.9   PLATELETS 10*3/mm3 283 268 273       Results from last 7 days   Lab Units 05/29/24  0454 05/28/24  0552 05/27/24  1253   SODIUM mmol/L 144 145 143   POTASSIUM mmol/L 3.8 3.4* 4.4   CHLORIDE mmol/L 112* 111* 111*   CO2 mmol/L 21.0* 21.0* 23.0   BUN mg/dL 12 13 17   CREATININE mg/dL 0.72* 0.76 1.31*   CALCIUM mg/dL 8.5* 8.4* 9.0   BILIRUBIN mg/dL  --   --  0.3   ALK PHOS U/L  --   --  119*   ALT (SGPT) U/L  --   --  22   AST (SGOT) U/L  --   --  15   GLUCOSE mg/dL 96 88 179*                 Lab Results   Lab Value Date/Time    TROPONINT 10 05/27/2024 1507    TROPONINT 10 05/27/2024 1253       Microbiology Results (last 10 days)       Procedure Component Value - Date/Time    CANDIDA AURIS SCREEN - Swab, Axilla Right, Axilla Left and Groin [[id number]]  (Normal) Collected: 05/28/24 0946    Lab Status: Preliminary result Specimen: Swab from Axilla Right, Axilla Left and Groin Updated: 05/29/24 1045     Kelly Auris Screen Culture No Candida auris isolated at 24 hours             Imaging Results (Last 7 Days)       Procedure Component Value Units Date/Time    CT Head Without Contrast [[id number]] Collected: 05/27/24 1628     Updated: 05/27/24  1650    Narrative:      CT HEAD AND CERVICAL SPINE WITHOUT CONTRAST     CLINICAL HISTORY: [] Post fall     TECHNIQUE: CT scan of the head and cervical spine was obtained with  axial soft tissue and bone algorithm images. No intravenous contrast was  administered. Sagittal and coronal reconstructions were obtained.     COMPARISON: [CT chest 2/18/2022]     FINDINGS:  No intracranial hemorrhage.  No midline shift or mass effect.   No CT evidence of acute territorial infarction.  No extraaxial  collection.  No hydrocephalus. Encephalomalacia involving a portion of  the bilateral frontal lobe straight gyri (series 2/image 11). Mostly  opacified left mastoid air cells. Opacified right maxillary sinus  completely seen on CT cervical spine with associated volume loss  suggesting chronicity.     Diffuse heterogeneous low bone mineralization throughout the cervical  spine, partially limits evaluation for fracture. Within that limitation,  cervical lordosis without subluxation. Vertebral body heights are  maintained without fracture. Advanced degeneration C6-C7 with ankylosis.  Mild disc degeneration throughout the remaining cervical spine.  Multilevel mild bilateral facet arthropathy. Likely mild spinal canal  stenosis at C6-C7 secondary to a posterior disc osteophyte complex. No  prevertebral soft tissue swelling. Partially visualized advanced  biapical centrilobular and paraseptal emphysema.          Impression:      1. No acute intracranial abnormality.  2. No cervical spine fracture or traumatic subluxation. Heterogeneous  low bone mineralization throughout the cervical spine partially limits  evaluation for fracture.  3. Encephalomalacia involving a portion of the bilateral frontal lobe  straight gyri is chronic in appearance and could be related to prior  remote trauma.  4. Partially visualized advanced biapical centrilobular and paraseptal  emphysema.              Radiation dose reduction techniques were utilized,  including automated  exposure control and exposure modulation based on body size.     This report was finalized on 5/27/2024 4:47 PM by Dr. Juan Hirsch M.D on Workstation: [id number]       CT Cervical Spine Without Contrast [[id number]] Collected: 05/27/24 1628     Updated: 05/27/24 1650    Narrative:      CT HEAD AND CERVICAL SPINE WITHOUT CONTRAST     CLINICAL HISTORY: [] Post fall     TECHNIQUE: CT scan of the head and cervical spine was obtained with  axial soft tissue and bone algorithm images. No intravenous contrast was  administered. Sagittal and coronal reconstructions were obtained.     COMPARISON: [CT chest 2/18/2022]     FINDINGS:  No intracranial hemorrhage.  No midline shift or mass effect.   No CT evidence of acute territorial infarction.  No extraaxial  collection.  No hydrocephalus. Encephalomalacia involving a portion of  the bilateral frontal lobe straight gyri (series 2/image 11). Mostly  opacified left mastoid air cells. Opacified right maxillary sinus  completely seen on CT cervical spine with associated volume loss  suggesting chronicity.     Diffuse heterogeneous low bone mineralization throughout the cervical  spine, partially limits evaluation for fracture. Within that limitation,  cervical lordosis without subluxation. Vertebral body heights are  maintained without fracture. Advanced degeneration C6-C7 with ankylosis.  Mild disc degeneration throughout the remaining cervical spine.  Multilevel mild bilateral facet arthropathy. Likely mild spinal canal  stenosis at C6-C7 secondary to a posterior disc osteophyte complex. No  prevertebral soft tissue swelling. Partially visualized advanced  biapical centrilobular and paraseptal emphysema.          Impression:      1. No acute intracranial abnormality.  2. No cervical spine fracture or traumatic subluxation. Heterogeneous  low bone mineralization throughout the cervical spine partially limits  evaluation for fracture.  3. Encephalomalacia  involving a portion of the bilateral frontal lobe  straight gyri is chronic in appearance and could be related to prior  remote trauma.  4. Partially visualized advanced biapical centrilobular and paraseptal  emphysema.              Radiation dose reduction techniques were utilized, including automated  exposure control and exposure modulation based on body size.     This report was finalized on 5/27/2024 4:47 PM by Dr. Juan Hirsch M.D on Workstation: [id number]       XR Chest 1 View [[id number]] Collected: 05/27/24 1404     Updated: 05/27/24 1409    Narrative:      XR CHEST 1 VW-     INDICATION: Weakness     COMPARISON: CT chest 2/18/2022       Impression:      Hyperinflated lungs corresponding with known emphysematous  changes. No focal consolidation. No pleural effusion or pneumothorax.  Normal size cardiomediastinal silhouette. No focal osseous abnormality.     This report was finalized on 5/27/2024 2:06 PM by Dr. Juan Hirsch M.D on Workstation: [id number]                 Assessment:        Hypotension  Dehydration  Hypokalemia  Elevated lactate  Acute kidney injury  COPD  Hypertension  Hyperlipidemia  Generalized anxiety disorder           Plan:    Patient has been hydrated at this time.  Will discontinue IV fluids.  Encourage p.o. intake.  Has persistent abnormal EKGs, no previous tracing for comparison.  Labile hypertension which is not quite [place].  Will ask cardiology to see patient.  Will request 2D echo.  Apparently no medication arose at the nursing facility.  Monitor and correct electrolytes, stable at this time.  Monitor mental status, baseline  DVT prophylaxis: Enoxaparin  Stress ulcer prophylaxis: Famotidine.  Labs in am      José Aviles MD  5/29/2024  13:01 EDT         I wore protective equipment throughout this patient encounter including a face mask, gloves, and protective eyewear.  Hand hygiene was performed before donning protective equipment and after removal when leaving the  room.

## 2024-05-29 NOTE — PLAN OF CARE
Goal Outcome Evaluation:  Plan of Care Reviewed With: patient        Progress: improving  Outcome Evaluation: AOx4,During this shift vital sign,lab and medication reviewed,Hydralazine is administered and B/P is monitored.Amodipine is order for B/P.Pt did not complain about any pain or dizziness. Npo from midnignt for stress test.Continoues fluid was stop.

## 2024-05-29 NOTE — PLAN OF CARE
Goal Outcome Evaluation:  Plan of Care Reviewed With: patient        Progress: improving   VSS. Bps good. No c/o dizziness. Up ad andrea to BR. IVF. No c/o pain. Orthostatics to be done in AM per MD order. EKG abnormal but same as previous. Labs. Possible DC back to facility today.

## 2024-05-29 NOTE — PROGRESS NOTES
"Nutrition Services    Patient Name:  Amos Nevarez  YOB: 1960  MRN: [id number]  Admit Date:  5/27/2024  Assessment Date:  05/29/24    Summary: BMI    Pt is a 63 y.o. male admitted for hypotension and a fall. History of hypertension. Patient reportedly was walking to the bathroom when he suddenly became lightheaded and fell, hitting head on the ground.     Pt on healthy heart diet with thin liquids. Appetite fluctuating, intakes at meals 25- 100%, mostly 75%. Per MD note, appears pt was dehydrated and has been responding well to fluids. Cardiology following. Pt is NPO after midnight for stress test in the am. Visited pt bedside. Pt reports appetite is up and down. Pt reports poor appetite relating to not liking the food served. Pt unaware of any recent wt loss. No wt hx available per EMR. Pt declined oral nutrition supplement. Moderate fat/muscle wasting noted per NFPE.    Patient meets ASPEN/AND criteria for nutrition diagnosis of moderate malnutrition of chronic illness based on: nfpe, po intakes    Plan/Recommend:  Encourage good po intake  Will continue to monitor intake, labs, wt    RD to follow.    CLINICAL NUTRITION ASSESSMENT      Reason for Assessment BMI     Diagnosis/Problem   Hypotension  Dehydration  Hypokalemia  Elevated lactate  Acute kidney injury  COPD  Hypertension  Hyperlipidemia  Generalized anxiety disorder   Medical/Surgical History History reviewed. No pertinent past medical history.    History reviewed. No pertinent surgical history.     Anthropometrics        Current Height  Current Weight  BMI kg/m2 Height: 167.6 cm (66\")  Weight: 49 kg (108 lb) (05/27/24 1608)  Body mass index is 17.43 kg/m².   Adjusted BMI (if applicable)    BMI Category Underweight (18.4 or below)   Ideal Body Weight (IBW) 142 lb   Usual Body Weight (UBW) unknown   Weight Trend Unknown   Weight History Wt Readings from Last 30 Encounters:   05/27/24 1608 49 kg (108 lb)      --  Labs       Pertinent " "Labs    Results from last 7 days   Lab Units 05/29/24 0454 05/28/24  0552 05/27/24  1253   SODIUM mmol/L 144 145 143   POTASSIUM mmol/L 3.8 3.4* 4.4   CHLORIDE mmol/L 112* 111* 111*   CO2 mmol/L 21.0* 21.0* 23.0   BUN mg/dL 12 13 17   CREATININE mg/dL 0.72* 0.76 1.31*   CALCIUM mg/dL 8.5* 8.4* 9.0   BILIRUBIN mg/dL  --   --  0.3   ALK PHOS U/L  --   --  119*   ALT (SGPT) U/L  --   --  22   AST (SGOT) U/L  --   --  15   GLUCOSE mg/dL 96 88 179*     Results from last 7 days   Lab Units 05/29/24 0454 05/28/24  0552 05/27/24  1253   HEMOGLOBIN g/dL 14.3   < > 14.6   HEMATOCRIT % 43.4   < > 46.9   WBC 10*3/mm3 8.06   < > 9.97   ALBUMIN g/dL  --   --  3.7    < > = values in this interval not displayed.     Results from last 7 days   Lab Units 05/29/24 0454 05/28/24  0552 05/27/24  1253   PLATELETS 10*3/mm3 283 268 273     No results found for: \"COVID19\"  No results found for: \"HGBA1C\"       Medications           Scheduled Medications amLODIPine, 10 mg, Oral, Q24H  busPIRone, 5 mg, Oral, TID  carvedilol, 25 mg, Oral, BID  enoxaparin, 30 mg, Subcutaneous, Q24H  famotidine, 20 mg, Oral, BID AC  folic acid, 1,000 mcg, Oral, Daily  hydrALAZINE, 100 mg, Oral, TID       Infusions       PRN Medications   acetaminophen     Physical Findings          General Findings alert, oriented, room air, underweight   Oral/Mouth Cavity tooth or teeth missing   Edema  no edema   Gastrointestinal WDL, last bowel movement: 5/28   Skin  skin intact, bruising   Tubes/Drains/Lines none   NFPE Unable to perform due to: pt out of room     Malnutrition Severity Assessment      Patient meets criteria for : Moderate (non-severe) Malnutrition  Malnutrition Type (Last 8 Hours)       Malnutrition Severity Assessment       Row Name 05/29/24 1501       Malnutrition Severity Assessment    Malnutrition Type Chronic Disease - Related Malnutrition      Row Name 05/29/24 1501       Insufficient Energy Intake     Insufficient Energy Intake Findings Moderate    " Insufficient Energy Intake  <75% of est. energy requirement for > or equal to 1 month      Row Name 05/29/24 1501       Muscle Loss    Loss of Muscle Mass Findings Moderate    Buddhism Region Moderate - slight depression    Clavicle Bone Region Moderate - some protrusion in females, visible in males    Dorsal Hand Region Moderate - slight depression      Row Name 05/29/24 1501       Fat Loss    Subcutaneous Fat Loss Findings Moderate    Orbital Region  Moderate -  somewhat hollowness, slightly dark circles    Upper Arm Region Moderate - some fat tissue, not ample      Row Name 05/29/24 1501       Criteria Met (Must meet criteria for severity in at least 2 of these categories: M Wasting, Fat Loss, Fluid, Secondary Signs, Wt. Status, Intake)    Patient meets criteria for  Moderate (non-severe) Malnutrition                       Current Nutrition Orders & Evaluation of Intake       Oral Nutrition     Food Allergies NKFA   Current PO Diet Diet: Cardiac; Healthy Heart (2-3 Na+); Fluid Consistency: Thin (IDDSI 0)  NPO Diet NPO Type: Sips with Meds   Supplement n/a   PO Evaluation     % PO Intake % - mostly 75%    Factors Affecting Intake: decreased appetite     Estimated Requirements         Weight used  49 kg    Calories  1715 - 1960 kcals (35-40 kcal/kg)    Protein  59 - 74 g (1.2 - 1.5 gm/kg)    Fluid   (Defer to physician)     PES STATEMENT / NUTRITION DIAGNOSIS      Nutrition Dx Problem  Problem: Underweight and Malnutrition (moderate)  Etiology: Medical Diagnosis - COPD, dehydration, JEREMY, HTN, HLD    Signs/Symptoms: NFPE Results, BMI, and Report/Observation     NUTRITION INTERVENTION / PLAN OF CARE      Intervention Goal(s) Reduce/improve symptoms, Disease management/therapy, No significant weight loss, and PO intake goal %: 75%         RD Intervention/Action Interview for preferences, Encourage intake, and Continue to monitor   --      Prescription/Orders:       PO Diet       Supplements Pt declined oral  nutrition supplement      Enteral Nutrition       Parenteral Nutrition    New Prescription Ordered? No, Recommended   --      Monitor/Evaluation Per protocol   Discharge Plan/Needs Pending clinical course   --    RD to follow per protocol.      Electronically signed by:  Brandi Berg RD  05/29/24 18:36 EDT

## 2024-05-30 ENCOUNTER — APPOINTMENT (OUTPATIENT)
Dept: NUCLEAR MEDICINE | Facility: HOSPITAL | Age: 64
End: 2024-05-30
Payer: MEDICAID

## 2024-05-30 ENCOUNTER — APPOINTMENT (OUTPATIENT)
Dept: CARDIOLOGY | Facility: HOSPITAL | Age: 64
End: 2024-05-30
Payer: MEDICAID

## 2024-05-30 VITALS
TEMPERATURE: 98.1 F | RESPIRATION RATE: 18 BRPM | SYSTOLIC BLOOD PRESSURE: 170 MMHG | HEIGHT: 66 IN | HEART RATE: 74 BPM | BODY MASS INDEX: 17.36 KG/M2 | WEIGHT: 108 LBS | DIASTOLIC BLOOD PRESSURE: 88 MMHG | OXYGEN SATURATION: 93 %

## 2024-05-30 LAB
ANION GAP SERPL CALCULATED.3IONS-SCNC: 11.8 MMOL/L (ref 5–15)
AORTIC DIMENSIONLESS INDEX: 0.8 (DI)
BASOPHILS # BLD AUTO: 0.05 10*3/MM3 (ref 0–0.2)
BASOPHILS NFR BLD AUTO: 0.6 % (ref 0–1.5)
BH CV ECHO MEAS - ACS: 1.91 CM
BH CV ECHO MEAS - AO MAX PG: 5.1 MMHG
BH CV ECHO MEAS - AO MEAN PG: 3.1 MMHG
BH CV ECHO MEAS - AO ROOT DIAM: 3 CM
BH CV ECHO MEAS - AO V2 MAX: 113.4 CM/SEC
BH CV ECHO MEAS - AO V2 VTI: 21.1 CM
BH CV ECHO MEAS - AVA(I,D): 2.18 CM2
BH CV ECHO MEAS - EDV(CUBED): 22.7 ML
BH CV ECHO MEAS - ESV(CUBED): 5.1 ML
BH CV ECHO MEAS - FS: 39.2 %
BH CV ECHO MEAS - IVS/LVPW: 0.85 CM
BH CV ECHO MEAS - IVSD: 0.78 CM
BH CV ECHO MEAS - LAT PEAK E' VEL: 5.7 CM/SEC
BH CV ECHO MEAS - LV MASS(C)D: 59.5 GRAMS
BH CV ECHO MEAS - LV MAX PG: 4.4 MMHG
BH CV ECHO MEAS - LV MEAN PG: 2.5 MMHG
BH CV ECHO MEAS - LV V1 MAX: 104.5 CM/SEC
BH CV ECHO MEAS - LV V1 VTI: 17.3 CM
BH CV ECHO MEAS - LVIDD: 2.8 CM
BH CV ECHO MEAS - LVIDS: 1.72 CM
BH CV ECHO MEAS - LVOT AREA: 2.7 CM2
BH CV ECHO MEAS - LVOT DIAM: 1.84 CM
BH CV ECHO MEAS - LVPWD: 0.92 CM
BH CV ECHO MEAS - MED PEAK E' VEL: 4.6 CM/SEC
BH CV ECHO MEAS - MV A MAX VEL: 60.2 CM/SEC
BH CV ECHO MEAS - MV DEC SLOPE: 140 CM/SEC2
BH CV ECHO MEAS - MV DEC TIME: 0.3 SEC
BH CV ECHO MEAS - MV E MAX VEL: 43.8 CM/SEC
BH CV ECHO MEAS - MV E/A: 0.73
BH CV ECHO MEAS - MV MAX PG: 4.3 MMHG
BH CV ECHO MEAS - MV MEAN PG: 0.9 MMHG
BH CV ECHO MEAS - MV P1/2T: 115.6 MSEC
BH CV ECHO MEAS - MV V2 VTI: 19.2 CM
BH CV ECHO MEAS - MVA(P1/2T): 1.9 CM2
BH CV ECHO MEAS - MVA(VTI): 2.4 CM2
BH CV ECHO MEAS - PA ACC TIME: 0.1 SEC
BH CV ECHO MEAS - PA V2 MAX: 109.6 CM/SEC
BH CV ECHO MEAS - PULM A REVS DUR: 0.11 SEC
BH CV ECHO MEAS - PULM A REVS VEL: 63.5 CM/SEC
BH CV ECHO MEAS - PULM DIAS VEL: 27.8 CM/SEC
BH CV ECHO MEAS - PULM S/D: 2.06
BH CV ECHO MEAS - PULM SYS VEL: 57.2 CM/SEC
BH CV ECHO MEAS - RAP SYSTOLE: 3 MMHG
BH CV ECHO MEAS - RV MAX PG: 4.1 MMHG
BH CV ECHO MEAS - RV V1 MAX: 101.2 CM/SEC
BH CV ECHO MEAS - RV V1 VTI: 18 CM
BH CV ECHO MEAS - RVSP: 25 MMHG
BH CV ECHO MEAS - SV(LVOT): 46.1 ML
BH CV ECHO MEAS - TR MAX PG: 22.1 MMHG
BH CV ECHO MEAS - TR MAX VEL: 234.8 CM/SEC
BH CV ECHO MEASUREMENTS AVERAGE E/E' RATIO: 8.5
BH CV REST NUCLEAR ISOTOPE DOSE: 11.5 MCI
BH CV STRESS COMMENTS STAGE 1: NORMAL
BH CV STRESS DOSE REGADENOSON STAGE 1: 0.4
BH CV STRESS DURATION MIN STAGE 1: 0
BH CV STRESS DURATION SEC STAGE 1: 10
BH CV STRESS NUCLEAR ISOTOPE DOSE: 32 MCI
BH CV STRESS PROTOCOL 1: NORMAL
BH CV STRESS RECOVERY BP: NORMAL MMHG
BH CV STRESS RECOVERY HR: 90 BPM
BH CV STRESS STAGE 1: 1
BH CV XLRA - TDI S': 13.8 CM/SEC
BUN SERPL-MCNC: 18 MG/DL (ref 8–23)
BUN/CREAT SERPL: 21.2 (ref 7–25)
CALCIUM SPEC-SCNC: 9.3 MG/DL (ref 8.6–10.5)
CHLORIDE SERPL-SCNC: 107 MMOL/L (ref 98–107)
CO2 SERPL-SCNC: 22.2 MMOL/L (ref 22–29)
CREAT SERPL-MCNC: 0.85 MG/DL (ref 0.76–1.27)
DEPRECATED RDW RBC AUTO: 44.9 FL (ref 37–54)
EGFRCR SERPLBLD CKD-EPI 2021: 96.4 ML/MIN/1.73
EOSINOPHIL # BLD AUTO: 0.12 10*3/MM3 (ref 0–0.4)
EOSINOPHIL NFR BLD AUTO: 1.3 % (ref 0.3–6.2)
ERYTHROCYTE [DISTWIDTH] IN BLOOD BY AUTOMATED COUNT: 13.2 % (ref 12.3–15.4)
GEN 5 2HR TROPONIN T REFLEX: 9 NG/L
GLUCOSE SERPL-MCNC: 110 MG/DL (ref 65–99)
HCT VFR BLD AUTO: 50.2 % (ref 37.5–51)
HGB BLD-MCNC: 16 G/DL (ref 13–17.7)
IMM GRANULOCYTES # BLD AUTO: 0.02 10*3/MM3 (ref 0–0.05)
IMM GRANULOCYTES NFR BLD AUTO: 0.2 % (ref 0–0.5)
LV EF NUC BP: 70 %
LYMPHOCYTES # BLD AUTO: 2.33 10*3/MM3 (ref 0.7–3.1)
LYMPHOCYTES NFR BLD AUTO: 25.7 % (ref 19.6–45.3)
MAXIMAL PREDICTED HEART RATE: 155 BPM
MCH RBC QN AUTO: 29 PG (ref 26.6–33)
MCHC RBC AUTO-ENTMCNC: 31.9 G/DL (ref 31.5–35.7)
MCV RBC AUTO: 90.9 FL (ref 79–97)
MONOCYTES # BLD AUTO: 0.63 10*3/MM3 (ref 0.1–0.9)
MONOCYTES NFR BLD AUTO: 6.9 % (ref 5–12)
NEUTROPHILS NFR BLD AUTO: 5.92 10*3/MM3 (ref 1.7–7)
NEUTROPHILS NFR BLD AUTO: 65.3 % (ref 42.7–76)
NRBC BLD AUTO-RTO: 0 /100 WBC (ref 0–0.2)
PERCENT MAX PREDICTED HR: 73.55 %
PLATELET # BLD AUTO: 315 10*3/MM3 (ref 140–450)
PMV BLD AUTO: 10.1 FL (ref 6–12)
POTASSIUM SERPL-SCNC: 3.7 MMOL/L (ref 3.5–5.2)
QT INTERVAL: 413 MS
QTC INTERVAL: 426 MS
RBC # BLD AUTO: 5.52 10*6/MM3 (ref 4.14–5.8)
SINUS: 3.1 CM
SODIUM SERPL-SCNC: 141 MMOL/L (ref 136–145)
STRESS BASELINE BP: NORMAL MMHG
STRESS BASELINE HR: 63 BPM
STRESS PERCENT HR: 87 %
STRESS POST PEAK BP: NORMAL MMHG
STRESS POST PEAK HR: 114 BPM
STRESS TARGET HR: 132 BPM
TROPONIN T DELTA: 2 NG/L
TROPONIN T SERPL HS-MCNC: 7 NG/L
WBC NRBC COR # BLD AUTO: 9.07 10*3/MM3 (ref 3.4–10.8)

## 2024-05-30 PROCEDURE — 25010000002 REGADENOSON 0.4 MG/5ML SOLUTION: Performed by: INTERNAL MEDICINE

## 2024-05-30 PROCEDURE — 78452 HT MUSCLE IMAGE SPECT MULT: CPT | Performed by: INTERNAL MEDICINE

## 2024-05-30 PROCEDURE — 93018 CV STRESS TEST I&R ONLY: CPT | Performed by: INTERNAL MEDICINE

## 2024-05-30 PROCEDURE — 93306 TTE W/DOPPLER COMPLETE: CPT | Performed by: INTERNAL MEDICINE

## 2024-05-30 PROCEDURE — 93017 CV STRESS TEST TRACING ONLY: CPT

## 2024-05-30 PROCEDURE — A9500 TC99M SESTAMIBI: HCPCS | Performed by: INTERNAL MEDICINE

## 2024-05-30 PROCEDURE — 93010 ELECTROCARDIOGRAM REPORT: CPT | Performed by: INTERNAL MEDICINE

## 2024-05-30 PROCEDURE — 78452 HT MUSCLE IMAGE SPECT MULT: CPT

## 2024-05-30 PROCEDURE — 85025 COMPLETE CBC W/AUTO DIFF WBC: CPT | Performed by: INTERNAL MEDICINE

## 2024-05-30 PROCEDURE — 0 TECHNETIUM SESTAMIBI: Performed by: INTERNAL MEDICINE

## 2024-05-30 PROCEDURE — G0378 HOSPITAL OBSERVATION PER HR: HCPCS

## 2024-05-30 PROCEDURE — 84484 ASSAY OF TROPONIN QUANT: CPT

## 2024-05-30 PROCEDURE — 99204 OFFICE O/P NEW MOD 45 MIN: CPT | Performed by: NURSE PRACTITIONER

## 2024-05-30 PROCEDURE — 80048 BASIC METABOLIC PNL TOTAL CA: CPT | Performed by: INTERNAL MEDICINE

## 2024-05-30 PROCEDURE — 93005 ELECTROCARDIOGRAM TRACING: CPT

## 2024-05-30 PROCEDURE — 99214 OFFICE O/P EST MOD 30 MIN: CPT | Performed by: NURSE PRACTITIONER

## 2024-05-30 PROCEDURE — 93306 TTE W/DOPPLER COMPLETE: CPT

## 2024-05-30 RX ORDER — REGADENOSON 0.08 MG/ML
0.4 INJECTION, SOLUTION INTRAVENOUS
Status: COMPLETED | OUTPATIENT
Start: 2024-05-30 | End: 2024-05-30

## 2024-05-30 RX ORDER — LOSARTAN POTASSIUM 50 MG/1
50 TABLET ORAL 2 TIMES DAILY
Status: DISCONTINUED | OUTPATIENT
Start: 2024-05-30 | End: 2024-05-30 | Stop reason: HOSPADM

## 2024-05-30 RX ORDER — LOSARTAN POTASSIUM 50 MG/1
50 TABLET ORAL 2 TIMES DAILY
Qty: 60 TABLET | Refills: 3 | Status: SHIPPED | OUTPATIENT
Start: 2024-05-30

## 2024-05-30 RX ORDER — LOSARTAN POTASSIUM 50 MG/1
50 TABLET ORAL 2 TIMES DAILY
Status: DISCONTINUED | OUTPATIENT
Start: 2024-05-30 | End: 2024-05-30

## 2024-05-30 RX ADMIN — TECHNETIUM TC 99M SESTAMIBI 1 DOSE: 1 INJECTION INTRAVENOUS at 06:52

## 2024-05-30 RX ADMIN — AMLODIPINE BESYLATE 10 MG: 10 TABLET ORAL at 13:34

## 2024-05-30 RX ADMIN — CARVEDILOL 25 MG: 25 TABLET, FILM COATED ORAL at 13:34

## 2024-05-30 RX ADMIN — REGADENOSON 0.4 MG: 0.08 INJECTION, SOLUTION INTRAVENOUS at 09:50

## 2024-05-30 RX ADMIN — FAMOTIDINE 20 MG: 20 TABLET, FILM COATED ORAL at 06:39

## 2024-05-30 RX ADMIN — FOLIC ACID 1000 MCG: 1 TABLET ORAL at 13:34

## 2024-05-30 RX ADMIN — TECHNETIUM TC 99M SESTAMIBI 1 DOSE: 1 INJECTION INTRAVENOUS at 09:50

## 2024-05-30 RX ADMIN — HYDRALAZINE HYDROCHLORIDE 100 MG: 50 TABLET ORAL at 13:34

## 2024-05-30 RX ADMIN — BUSPIRONE HYDROCHLORIDE 5 MG: 5 TABLET ORAL at 13:35

## 2024-05-30 NOTE — PROGRESS NOTES
[facility]  Cardiology Progress Note    Patient Identification:  Name: Amos Nevarez  Age: 63 y.o.  Sex: male  :  1960  MRN: [id number]                 Follow Up / Chief Complaint: follow up for hypotension/abnormal ecg    Interval History: Stress test/echo today.  No cp or shob          Objective:    Past Medical History:  History reviewed. No pertinent past medical history.  Past Surgical History:  History reviewed. No pertinent surgical history.     Social History:   Social History     Tobacco Use    Smoking status: Former     Current packs/day: 0.00     Average packs/day: 1 pack/day for 42.0 years (42.0 ttl pk-yrs)     Types: Cigarettes     Start date:      Quit date:      Years since quittin.4    Smokeless tobacco: Never   Substance Use Topics    Alcohol use: Never      Family History:  History reviewed. No pertinent family history.       Allergies:  No Known Allergies  Scheduled Meds:  amLODIPine, 10 mg, Q24H  busPIRone, 5 mg, TID  carvedilol, 25 mg, BID  enoxaparin, 30 mg, Q24H  famotidine, 20 mg, BID AC  folic acid, 1,000 mcg, Daily  hydrALAZINE, 100 mg, TID            INTAKE AND OUTPUT:    Intake/Output Summary (Last 24 hours) at 2024 1026  Last data filed at 2024 1746  Gross per 24 hour   Intake 120 ml   Output --   Net 120 ml       Review of Systems:   GI: no n/v or abd pain  Cardiac: no cp or palps  Pulmonary: no shob    Constitutional:  Temp:  [97.7 °F (36.5 °C)-98.2 °F (36.8 °C)] 98.2 °F (36.8 °C)  Heart Rate:  [57-71] 63  Resp:  [16-20] 18  BP: (133-181)/(80-93) 145/86    Physical Exam:  General:  Appears in no acute distress, resting in bed  Eyes: eom normal and no conjunctival drainage  HEENT:  No JVD. Thyroid not visibly enlarged. No mucosal pallor or cyanosis  Respiratory: Respirations regular and unlabored at rest. BBS with good air entry in all fields. No crackles, rubs or wheezes auscultated  Cardiovascular: S1S2 Regular rate and rhythm. No  "murmur, rub or gallop. No carotid bruits. DP/PT pulses     . No pretibial pitting edema  Gastrointestinal: Abdomen soft, flat, non tender. Bowel sounds present. No hepatosplenomegaly. No ascites  Musculoskeletal: JOE x4. No abnormal movements  Extremities: No digital clubbing or cyanosis  Skin:  Skin warm and dry to touch. No rashes    Neuro: AAO x3 CN II-XII grossly intact  Psych: Mood and affect normal, pleasant and cooperative          Cardiographics  Telemetry:     ECG:                            Imaging  Chest X-ray:   IMPRESSION:  Hyperinflated lungs corresponding with known emphysematous  changes. No focal consolidation. No pleural effusion or pneumothorax.  Normal size cardiomediastinal silhouette. No focal osseous abnormality.     This report was finalized on 5/27/2024 2:06 PM by Dr. Juan Hirsch M.D on Workstation: [id number]        Lab Review   Results from last 7 days   Lab Units 05/30/24  0815 05/30/24  0607 05/27/24  1507   HSTROP T ng/L 9 7 10         Results from last 7 days   Lab Units 05/30/24  0607   SODIUM mmol/L 141   POTASSIUM mmol/L 3.7   BUN mg/dL 18   CREATININE mg/dL 0.85   CALCIUM mg/dL 9.3     @[id number]@  Results from last 7 days   Lab Units 05/30/24  0608 05/29/24  0454 05/28/24  0552   WBC 10*3/mm3 9.07 8.06 7.25   HEMOGLOBIN g/dL 16.0 14.3 13.7   HEMATOCRIT % 50.2 43.4 41.8   PLATELETS 10*3/mm3 315 283 268             Assessment:  Hypotension  Dehydration  Elevated lactate  Hyperlipidemia  hypertension      Plan:  Blood pressure better.   Continue current bp medications at this time. We will adjust as needed.  Further recs once echo and stress test have been completed and read.      Addendum: Stress test negative. Ok to be discharged from our perspective. He will follow up on June 21 st at 11 am.      )5/30/2024  NEPTALI Cervantes/Transcription:   \"Dictated utilizing Dragon dictation\".     "

## 2024-05-30 NOTE — NURSING NOTE
Attempted to call report to [facility] x3. Contact information on front of packet to call this RN for report when he arrives.

## 2024-05-30 NOTE — PROGRESS NOTES
Continued Stay Note  [facility]     Patient Name: Amos Nevarez  MRN: [id number]  Today's Date: 5/30/2024    Admit Date: 5/27/2024    Plan: Return to [facility]   Discharge Plan       Row Name 05/30/24 1613       Plan    Plan Return to [facility]    Patient/Family in Agreement with Plan yes    Plan Comments Per Shandra/Tony [facility] has IC bed available.  Per MD - he will DC patient today.  Spoke with listed emergency contact Jodie Beatty and she is aware.  She states she does not have any responsibility for patient, I explained this is simply a notificiation.  RN has packet.  Alma BALLESTEROS/LOGAN navarrete scheduled for 5:30 p.m. Abigail SANFORD                         Expected Discharge Date and Time       Expected Discharge Date Expected Discharge Time    May 30, 2024               Becky S. [place], RN

## 2024-05-30 NOTE — PLAN OF CARE
Problem: Adult Inpatient Plan of Care  Goal: Plan of Care Review  Outcome: Adequate for Care Transition  Goal: Patient-Specific Goal (Individualized)  Outcome: Adequate for Care Transition  Goal: Absence of Hospital-Acquired Illness or Injury  Outcome: Adequate for Care Transition  Intervention: Identify and Manage Fall Risk  Recent Flowsheet Documentation  Taken 5/30/2024 1546 by Taylor Todd RN  Safety Promotion/Fall Prevention:   assistive device/personal items within reach   clutter free environment maintained   nonskid shoes/slippers when out of bed   room organization consistent   safety round/check completed  Taken 5/30/2024 1350 by Taylor Todd RN  Safety Promotion/Fall Prevention:   assistive device/personal items within reach   clutter free environment maintained   muscle strengthening facilitated   nonskid shoes/slippers when out of bed  Taken 5/30/2024 1200 by Taylor Todd RN  Safety Promotion/Fall Prevention:   assistive device/personal items within reach   clutter free environment maintained   nonskid shoes/slippers when out of bed   room organization consistent   safety round/check completed  Taken 5/30/2024 0949 by Taylor Todd RN  Safety Promotion/Fall Prevention: patient off unit  Taken 5/30/2024 0849 by Taylor Todd RN  Safety Promotion/Fall Prevention:   assistive device/personal items within reach   clutter free environment maintained   nonskid shoes/slippers when out of bed   room organization consistent   safety round/check completed  Intervention: Prevent Skin Injury  Recent Flowsheet Documentation  Taken 5/30/2024 1546 by Taylor Todd RN  Body Position: supine  Taken 5/30/2024 1350 by Taylor Todd RN  Body Position: supine  Taken 5/30/2024 1200 by Taylor Todd RN  Body Position:   position changed independently   supine  Taken 5/30/2024 0849 by Taylor Todd RN  Body Position:   position changed independently    supine  Intervention: Prevent and Manage VTE (Venous Thromboembolism) Risk  Recent Flowsheet Documentation  Taken 5/30/2024 1350 by Taylor Todd, RN  Activity Management: up ad andrea  Taken 5/30/2024 1200 by Taylor Todd, RN  Activity Management: up ad andrea  Taken 5/30/2024 0849 by Taylor Todd, RN  Activity Management: up ad andrea  Goal: Optimal Comfort and Wellbeing  Outcome: Adequate for Care Transition  Goal: Readiness for Transition of Care  Outcome: Adequate for Care Transition     Problem: Hypertension Comorbidity  Goal: Blood Pressure in Desired Range  Outcome: Adequate for Care Transition   Goal Outcome Evaluation:

## 2024-05-30 NOTE — PLAN OF CARE
Goal Outcome Evaluation:              Outcome Evaluation: No complaints this shift. Rested well. NPO after midnight for stress test today. VSS.

## 2024-05-30 NOTE — PROGRESS NOTES
Continued Stay Note  [facility]     Patient Name: Amos Nevarez  MRN: [id number]  Today's Date: 5/30/2024    Admit Date: 5/27/2024    Plan: Return to [facility]   Discharge Plan       Row Name 05/30/24 1613       Plan    Plan Return to [facility]    Patient/Family in Agreement with Plan yes    Plan Comments Email to Awais, that patient will return to LTC at [facility] today.   Per MD - he will DC patient today.  Spoke with listed emergency contact Jodie Beatty and she is aware.  She states she does not have any responsibility for patient, I explained this is simply a notificiation.  RN has packet.  [facility] W/C van scheduled for 5:30 p.m. Abigail SANFORD      Row Name 05/30/24 1600       Plan    Plan Return to [facility]    Patient/Family in Agreement with Plan yes    Plan Comments Correction -  patient will return to  bed at [facility] @ 5:30 p.m. today.  Abigail SANFORD                    Expected Discharge Date and Time       Expected Discharge Date Expected Discharge Time    May 30, 2024               Becky S. [place], RN

## 2024-05-30 NOTE — DISCHARGE SUMMARY
PHYSICIAN DISCHARGE SUMMARY  [facility], [facility]    Patient Identification:    Name: Amos Nevarez  Age: 63 y.o.  Sex: male  :  1960  MRN: [id number]    Primary Care Physician: José Aviles MD    Admit date: 2024    Discharge date and time:2024    Discharged Condition: fair    Discharge Diagnoses:    Hypotension  Acute kidney injury  Dehydration  Hypokalemia  Elevated lactate  COPD  Hypertension  Hyperlipidemia  Generalized anxiety disorder       Hospital Course: Amos Nevarez  is a 63-year-old gentleman, resident of nursing facility.  Patient has history of emphysema, hypertension, anxiety, hyperlipidemia.  The day of admission, patient went to the bathroom and got dizzy, lost his balance and fell forward onto the toilet.  His blood pressure was 90/67 and after a few minutes it went down to 60/34.  Patient was sent to the ER for evaluation.  In the ER, patient was found to have: Blood pressure 81/49, creatinine 1.31, sodium 143, lactic acid 2.8, WBC 9.87, hemoglobin 14.6.  Urinalysis was negative for infection, chest x-ray show hyperinflated lungs/emphysematous changes.  Nothing acute.  In the ER, patient was given bolus normal saline 1 L, patient was placed in the hospital for further management.    Upon admission, patient was continued on IV fluids.  Orthostatics were checked.  Potassium was low and was corrected accordingly.  Patient was placed on DVT and stress ulcer prophylaxis.  During the hospitalization, acute coronary syndrome was ruled out.  Troponin were negative x 3.  EKG showed persistent ST-T wave changes.  Cardio was consulted and they recommended an stress test which was negative also echo which was completely normal with normal EF.  Today, patient is much better, no episodes of syncope or lightheadedness or fainting since admission.  Patient is stable to be discharged back to nursing facility.  I will see him at the nurse  "facility early next week.    PMHX: History reviewed. No pertinent past medical history.  PSHX: History reviewed. No pertinent surgical history.        Consults:     Consults       Date and Time Order Name Status Description    5/29/2024  1:02 PM Inpatient Cardiology Consult Completed     5/27/2024  2:28 PM Family Medicine Consult              Discharge Exam:    /88   Pulse 74   Temp 98.1 °F (36.7 °C) (Oral)   Resp 18   Ht 167.6 cm (66\")   Wt 49 kg (108 lb)   SpO2 93%   BMI 17.43 kg/m²     General: Alert, oriented x 2-3. Cooperative, no distress, appears stated age  Neck: Supple, symmetrical, trachea midline, no adenopathy;              thyroid:  no enlargement/tenderness/nodules;              no carotid bruit or JVD  Cardiovascular: Normal rate, regular rhythm and intact distal pulses.              Exam reveals no gallop and no friction rub. No murmur heard  Pulmonary: Clear to auscultation bilaterally, respirations unlabored.               No rhonchi, wheezing or rales.   Abdominal: Soft, nontender, bowel sounds active all four quadrants,     no masses, no hepatomegaly, no splenomegaly.   Extremities: Normal, atraumatic, no cyanosis or edema  Pulses: 2 + symmetric all extremities  Neurological: Patient is alert and oriented to person, place. No new focal sensorimotor deficit.   Skin: Skin color, texture, normal. Turgor is decreased. No rashes or lesions          Data Review:        Results from last 7 days   Lab Units 05/30/24  0608 05/29/24  0454 05/28/24  0552   WBC 10*3/mm3 9.07 8.06 7.25   HEMOGLOBIN g/dL 16.0 14.3 13.7   HEMATOCRIT % 50.2 43.4 41.8   PLATELETS 10*3/mm3 315 629 268       Results from last 7 days   Lab Units 05/30/24  0607 05/29/24  0454 05/28/24  0552 05/27/24  1253   SODIUM mmol/L 141 144 145 143   POTASSIUM mmol/L 3.7 3.8 3.4* 4.4   CHLORIDE mmol/L 107 112* 111* 111*   CO2 mmol/L 22.2 21.0* 21.0* 23.0   BUN mg/dL 18 12 13 17   CREATININE mg/dL 0.85 0.72* 0.76 1.31*   CALCIUM " mg/dL 9.3 8.5* 8.4* 9.0   BILIRUBIN mg/dL  --   --   --  0.3   ALK PHOS U/L  --   --   --  119*   ALT (SGPT) U/L  --   --   --  22   AST (SGOT) U/L  --   --   --  15   GLUCOSE mg/dL 110* 96 88 179*           Lab Results   Lab Value Date/Time    TROPONINT 9 05/30/2024 0815    TROPONINT 7 05/30/2024 0607    TROPONINT 10 05/27/2024 1507    TROPONINT 10 05/27/2024 1253       Microbiology Results (last 10 days)       Procedure Component Value - Date/Time    CANDIDA AURIS SCREEN - Swab, Axilla Right, Axilla Left and Groin [[id number]]  (Normal) Collected: 05/28/24 0946    Lab Status: Preliminary result Specimen: Swab from Axilla Right, Axilla Left and Groin Updated: 05/30/24 1045     Kelly Auris Screen Culture No Candida auris isolated at 2 days             Imaging Results (All)       Procedure Component Value Units Date/Time    CT Head Without Contrast [[id number]] Collected: 05/27/24 1628     Updated: 05/27/24 1650    Narrative:      CT HEAD AND CERVICAL SPINE WITHOUT CONTRAST     CLINICAL HISTORY: [] Post fall     TECHNIQUE: CT scan of the head and cervical spine was obtained with  axial soft tissue and bone algorithm images. No intravenous contrast was  administered. Sagittal and coronal reconstructions were obtained.     COMPARISON: [CT chest 2/18/2022]     FINDINGS:  No intracranial hemorrhage.  No midline shift or mass effect.   No CT evidence of acute territorial infarction.  No extraaxial  collection.  No hydrocephalus. Encephalomalacia involving a portion of  the bilateral frontal lobe straight gyri (series 2/image 11). Mostly  opacified left mastoid air cells. Opacified right maxillary sinus  completely seen on CT cervical spine with associated volume loss  suggesting chronicity.     Diffuse heterogeneous low bone mineralization throughout the cervical  spine, partially limits evaluation for fracture. Within that limitation,  cervical lordosis without subluxation. Vertebral body heights are  maintained without  fracture. Advanced degeneration C6-C7 with ankylosis.  Mild disc degeneration throughout the remaining cervical spine.  Multilevel mild bilateral facet arthropathy. Likely mild spinal canal  stenosis at C6-C7 secondary to a posterior disc osteophyte complex. No  prevertebral soft tissue swelling. Partially visualized advanced  biapical centrilobular and paraseptal emphysema.          Impression:      1. No acute intracranial abnormality.  2. No cervical spine fracture or traumatic subluxation. Heterogeneous  low bone mineralization throughout the cervical spine partially limits  evaluation for fracture.  3. Encephalomalacia involving a portion of the bilateral frontal lobe  straight gyri is chronic in appearance and could be related to prior  remote trauma.  4. Partially visualized advanced biapical centrilobular and paraseptal  emphysema.              Radiation dose reduction techniques were utilized, including automated  exposure control and exposure modulation based on body size.     This report was finalized on 5/27/2024 4:47 PM by Dr. Juan Hirsch M.D on Workstation: [id number]       CT Cervical Spine Without Contrast [[id number]] Collected: 05/27/24 1628     Updated: 05/27/24 1650    Narrative:      CT HEAD AND CERVICAL SPINE WITHOUT CONTRAST     CLINICAL HISTORY: [] Post fall     TECHNIQUE: CT scan of the head and cervical spine was obtained with  axial soft tissue and bone algorithm images. No intravenous contrast was  administered. Sagittal and coronal reconstructions were obtained.     COMPARISON: [CT chest 2/18/2022]     FINDINGS:  No intracranial hemorrhage.  No midline shift or mass effect.   No CT evidence of acute territorial infarction.  No extraaxial  collection.  No hydrocephalus. Encephalomalacia involving a portion of  the bilateral frontal lobe straight gyri (series 2/image 11). Mostly  opacified left mastoid air cells. Opacified right maxillary sinus  completely seen on CT cervical spine with  associated volume loss  suggesting chronicity.     Diffuse heterogeneous low bone mineralization throughout the cervical  spine, partially limits evaluation for fracture. Within that limitation,  cervical lordosis without subluxation. Vertebral body heights are  maintained without fracture. Advanced degeneration C6-C7 with ankylosis.  Mild disc degeneration throughout the remaining cervical spine.  Multilevel mild bilateral facet arthropathy. Likely mild spinal canal  stenosis at C6-C7 secondary to a posterior disc osteophyte complex. No  prevertebral soft tissue swelling. Partially visualized advanced  biapical centrilobular and paraseptal emphysema.          Impression:      1. No acute intracranial abnormality.  2. No cervical spine fracture or traumatic subluxation. Heterogeneous  low bone mineralization throughout the cervical spine partially limits  evaluation for fracture.  3. Encephalomalacia involving a portion of the bilateral frontal lobe  straight gyri is chronic in appearance and could be related to prior  remote trauma.  4. Partially visualized advanced biapical centrilobular and paraseptal  emphysema.              Radiation dose reduction techniques were utilized, including automated  exposure control and exposure modulation based on body size.     This report was finalized on 5/27/2024 4:47 PM by Dr. Juan Hirsch M.D on Workstation: [id number]       XR Chest 1 View [[id number]] Collected: 05/27/24 1404     Updated: 05/27/24 1409    Narrative:      XR CHEST 1 VW-     INDICATION: Weakness     COMPARISON: CT chest 2/18/2022       Impression:      Hyperinflated lungs corresponding with known emphysematous  changes. No focal consolidation. No pleural effusion or pneumothorax.  Normal size cardiomediastinal silhouette. No focal osseous abnormality.     This report was finalized on 5/27/2024 2:06 PM by Dr. Juan Hirsch M.D on Workstation: [id number]                 Disposition:    Skilled nursing  facility    Patient Instructions:        Discharge Medications        New Medications        Instructions Start Date   losartan 50 MG tablet  Commonly known as: COZAAR   50 mg, Oral, 2 Times Daily             Continue These Medications        Instructions Start Date   acetaminophen 325 MG tablet  Commonly known as: TYLENOL   650 mg, Oral, Every 4 Hours PRN      amLODIPine 10 MG tablet  Commonly known as: NORVASC   1 tablet, Oral, Daily      busPIRone 5 MG tablet  Commonly known as: BUSPAR   5 mg, Oral, 2 Times Daily      carvedilol 25 MG tablet  Commonly known as: COREG   1 tablet, Oral, 2 Times Daily      folic acid 1 MG tablet  Commonly known as: FOLVITE   1 tablet, Oral, 2 Times Daily      hydrALAZINE 100 MG tablet  Commonly known as: APRESOLINE   1 tablet, Oral, 3 Times Daily             Stop These Medications      cloNIDine 0.3 MG tablet  Commonly known as: CATAPRES     clotrimazole 1 % cream  Commonly known as: LOTRIMIN     Dulcolax 10 MG suppository  Generic drug: bisacodyl              Discharge Order (From admission, onward)       Start     Ordered    05/30/24 1643  Discharge patient  Once        Expected Discharge Date: 05/30/24   Expected Discharge Time: Afternoon   Discharge Disposition: Skilled Nursing Facility (DC - External)   Physician of Record for Attribution - Please select from Treatment Team: NELSON AVILES [[id number]]   Review needed by CMO to determine Physician of Record: No      Question Answer Comment   Physician of Record for Attribution - Please select from Treatment Team NELSON AVILES    Review needed by CMO to determine Physician of Record No        05/30/24 1642                     Contact information for follow-up providers       Nelson Aviles MD .    Specialties: Internal Medicine, Hospitalist  Contact information:  [address] [place], [place] [id number] [phone]                       Contact information for after-discharge care       Destination       [facility] .    Service: Skilled Nursing  Contact information:  [address] [place] [address] [phone]                                   Total time spent discharging patient including evaluation,post hospitalization follow up,  medication and post hospitalization instructions and education total time exceeds 30 minutes.    Signed:  José Aviles MD  5/30/2024  16:43 EDT       I wore protective equipment throughout this patient encounter including a face mask, gloves, and protective eyewear.  Hand hygiene was performed before donning protective equipment and after removal when leaving the room.

## 2024-05-31 NOTE — PROGRESS NOTES
Case Management Discharge Note      Final Note: DC'd to [facility] via [facility]/C [facility] 5/30         Selected Continued Care - Discharged on 5/30/2024 Admission date: 5/27/2024 - Discharge disposition: Skilled Nursing Facility (DC - External)      Destination Coordination complete.      Service Provider Selected Services Address Phone Fax Patient Preferred    [facility] [address] [place] [address] [phone] --                          Transportation Services  W/C Van: [facility] Zamora    Final Discharge Disposition Code: 04 - intermediate care facility

## 2024-06-02 LAB — BACTERIA ISLT: NORMAL

## 2024-06-21 ENCOUNTER — OFFICE VISIT (OUTPATIENT)
Dept: CARDIOLOGY | Facility: CLINIC | Age: 64
End: 2024-06-21
Payer: MEDICAID

## 2024-06-21 VITALS
HEIGHT: 65 IN | HEART RATE: 72 BPM | WEIGHT: 107 LBS | DIASTOLIC BLOOD PRESSURE: 90 MMHG | SYSTOLIC BLOOD PRESSURE: 130 MMHG | BODY MASS INDEX: 17.83 KG/M2

## 2024-06-21 DIAGNOSIS — Z09 HOSPITAL DISCHARGE FOLLOW-UP: Primary | ICD-10-CM

## 2024-06-21 RX ORDER — BISACODYL 10 MG
10 SUPPOSITORY, RECTAL RECTAL DAILY
COMMUNITY

## 2024-06-21 RX ORDER — LISINOPRIL 40 MG/1
1 TABLET ORAL DAILY
COMMUNITY

## 2024-06-21 RX ORDER — ERGOCALCIFEROL 1.25 MG/1
50000 CAPSULE ORAL WEEKLY
COMMUNITY

## 2024-06-21 RX ORDER — MIRTAZAPINE 7.5 MG/1
7.5 TABLET, FILM COATED ORAL NIGHTLY
COMMUNITY

## 2024-06-21 NOTE — PROGRESS NOTES
Subjective:        Amos Nevarez is a 63 y.o. male who here for follow up    Chief Complaint   Patient presents with    Hospital Follow Up Visit     HYPOTENSION       HPI    Amos Shah is a 63-year-old male who is here for hypotension after being released from the hospital.  He has a history to include hyperlipidemia, emphysema, anxiety and hypertension history.    Follow station 5/27/2024 and discharged on 5/30/2024.  He was hospitalized due to hypotension, JEREMY, dehydration, hypokalemia, elevated lactate, and COPD.  After falling at home due to becoming dizzy and blood pressures 60-90 systolically 34-67 diastolically he went to the emergency room for evaluation.  Blood pressure in the ER was 81/49 with creatinine 1.31 and sodium 143.  His lactic acid was 2.8 at the time.  Chest x-ray showed no acute process.  Improvement of blood pressure with fluids.      He underwent a stress test which was negative and his echo showed EF 61 to 65% with normal LV function.  There was calcification of the AV valve.        The following portions of the patient's history were reviewed and updated as appropriate: allergies, current medications, past family history, past medical history, past social history, past surgical history and problem list.    History reviewed. No pertinent past medical history.      reports that he quit smoking about 4 years ago. His smoking use included cigarettes. He started smoking about 46 years ago. He has a 42 pack-year smoking history. He has never used smokeless tobacco. He reports that he does not drink alcohol and does not use drugs.     History reviewed. No pertinent family history.    ROS     Review of Systems  Constitutional: No wt loss, fever, fatigue  Gastrointestinal: No nausea, abdominal pain  Behavioral/Psych: No insomnia or anxiety  Cardiovascular:denies cp and shob      Objective:           Constitutional:       Appearance: Well-developed.   Neck:      Vascular: No JVD.       Trachea: No tracheal deviation.   Pulmonary:      Effort: Pulmonary effort is normal. No respiratory distress.      Breath sounds: Normal breath sounds. No stridor. No decreased breath sounds. No wheezing. No rhonchi. No rales.   Chest:      Chest wall: Not tender to palpatation.   Cardiovascular:      Normal rate. Regular rhythm.      No gallop.    Pulses:     Intact distal pulses.   Edema:     Peripheral edema absent.   Abdominal:      General: Bowel sounds are normal. There is no distension.      Palpations: Abdomen is soft.      Tenderness: There is no abdominal tenderness.   Skin:     General: Skin is warm.   Neurological:      Mental Status: Alert and oriented to person, place, and time.      Deep Tendon Reflexes: Reflexes are normal and symmetric.         Procedures    Interpretation Summary         Left ventricular ejection fraction appears to be 61 - 65%.    Left ventricular diastolic function was normal.    There is calcification of the aortic valve.    Estimated right ventricular systolic pressure from tricuspid regurgitation is normal (<35 mmHg).         Interpretation Summary         Findings consistent with an equivocal ECG stress test.    Myocardial perfusion imaging indicates a normal myocardial perfusion study with no evidence of ischemia. Impressions are consistent with a low risk study.    Left ventricular ejection fraction is normal (Calculated EF = 70%).    Current Outpatient Medications:     acetaminophen (TYLENOL) 325 MG tablet, Take 2 tablets by mouth Every 4 (Four) Hours As Needed., Disp: , Rfl:     amLODIPine (NORVASC) 10 MG tablet, Take 1 tablet by mouth Daily., Disp: , Rfl:     bisacodyl (Dulcolax) 10 MG suppository, Insert 1 suppository into the rectum Daily., Disp: , Rfl:     busPIRone (BUSPAR) 5 MG tablet, Take 1 tablet by mouth 2 (Two) Times a Day., Disp: , Rfl:     carvedilol (COREG) 25 MG tablet, Take 1 tablet by mouth 2 (Two) Times a Day., Disp: , Rfl:     folic acid (FOLVITE) 1 MG  tablet, Take 1 tablet by mouth 2 (Two) Times a Day., Disp: , Rfl:     hydrALAZINE (APRESOLINE) 100 MG tablet, Take 1 tablet by mouth 3 (Three) Times a Day., Disp: , Rfl:     lisinopril (PRINIVIL,ZESTRIL) 40 MG tablet, Take 1 tablet by mouth Daily., Disp: , Rfl:     losartan (COZAAR) 50 MG tablet, Take 1 tablet by mouth 2 (Two) Times a Day., Disp: 60 tablet, Rfl: 3    mirtazapine (REMERON) 7.5 MG tablet, Take 1 tablet by mouth Every Night., Disp: , Rfl:     vitamin D (ERGOCALCIFEROL) 1.25 MG (67561 UT) capsule capsule, Take 1 capsule by mouth 1 (One) Time Per Week., Disp: , Rfl:      Assessment:        Patient Active Problem List   Diagnosis    Hypotension               Plan:   1.  Hospital follow-up for syncope.  Testing has been negative.             No diagnosis found.    There are no diagnoses linked to this encounter.    COUNSELING: mariann Salazar was given to patient for the following topics: diagnostic results, risk factor reductions, impressions, risks and benefits of treatment options and importance of treatment compliance .       SMOKING COUNSELING: denies    Will follow-up in 6 months, unless he needs to be seen sooner.    Sincerely,   NEPTALI Cervantes  [facility]  06/21/24  11:25 EDT    EMR Dragon/Transcription disclaimer: Dictated utilizing Dragon Dictation